# Patient Record
Sex: MALE | Race: WHITE | NOT HISPANIC OR LATINO | URBAN - METROPOLITAN AREA
[De-identification: names, ages, dates, MRNs, and addresses within clinical notes are randomized per-mention and may not be internally consistent; named-entity substitution may affect disease eponyms.]

---

## 2021-12-09 ENCOUNTER — EMERGENCY (EMERGENCY)
Facility: HOSPITAL | Age: 36
LOS: 1 days | Discharge: HOME | End: 2021-12-09
Attending: EMERGENCY MEDICINE
Payer: COMMERCIAL

## 2021-12-09 VITALS
HEIGHT: 68 IN | WEIGHT: 164.91 LBS | HEART RATE: 110 BPM | RESPIRATION RATE: 20 BRPM | OXYGEN SATURATION: 100 % | DIASTOLIC BLOOD PRESSURE: 70 MMHG | TEMPERATURE: 98 F | SYSTOLIC BLOOD PRESSURE: 139 MMHG

## 2021-12-09 LAB
ALBUMIN SERPL ELPH-MCNC: 4.9 G/DL — SIGNIFICANT CHANGE UP (ref 3.3–5.2)
ALP SERPL-CCNC: 59 U/L — SIGNIFICANT CHANGE UP (ref 40–120)
ALT FLD-CCNC: 34 U/L — SIGNIFICANT CHANGE UP
ANION GAP SERPL CALC-SCNC: 15 MMOL/L — SIGNIFICANT CHANGE UP (ref 5–17)
AST SERPL-CCNC: 42 U/L — HIGH
BASOPHILS # BLD AUTO: 0.05 K/UL — SIGNIFICANT CHANGE UP (ref 0–0.2)
BASOPHILS NFR BLD AUTO: 0.7 % — SIGNIFICANT CHANGE UP (ref 0–2)
BILIRUB SERPL-MCNC: 0.9 MG/DL — SIGNIFICANT CHANGE UP (ref 0.4–2)
BLD GP AB SCN SERPL QL: SIGNIFICANT CHANGE UP
BUN SERPL-MCNC: 12.6 MG/DL — SIGNIFICANT CHANGE UP (ref 8–20)
CALCIUM SERPL-MCNC: 10 MG/DL — SIGNIFICANT CHANGE UP (ref 8.6–10.2)
CHLORIDE SERPL-SCNC: 93 MMOL/L — LOW (ref 98–107)
CO2 SERPL-SCNC: 25 MMOL/L — SIGNIFICANT CHANGE UP (ref 22–29)
CREAT SERPL-MCNC: 0.67 MG/DL — SIGNIFICANT CHANGE UP (ref 0.5–1.3)
EOSINOPHIL # BLD AUTO: 0.33 K/UL — SIGNIFICANT CHANGE UP (ref 0–0.5)
EOSINOPHIL NFR BLD AUTO: 4.7 % — SIGNIFICANT CHANGE UP (ref 0–6)
GLUCOSE SERPL-MCNC: 116 MG/DL — HIGH (ref 70–99)
HCT VFR BLD CALC: 42.1 % — SIGNIFICANT CHANGE UP (ref 39–50)
HGB BLD-MCNC: 14.8 G/DL — SIGNIFICANT CHANGE UP (ref 13–17)
IMM GRANULOCYTES NFR BLD AUTO: 0.3 % — SIGNIFICANT CHANGE UP (ref 0–1.5)
LYMPHOCYTES # BLD AUTO: 1.58 K/UL — SIGNIFICANT CHANGE UP (ref 1–3.3)
LYMPHOCYTES # BLD AUTO: 22.3 % — SIGNIFICANT CHANGE UP (ref 13–44)
MCHC RBC-ENTMCNC: 33.9 PG — SIGNIFICANT CHANGE UP (ref 27–34)
MCHC RBC-ENTMCNC: 35.2 GM/DL — SIGNIFICANT CHANGE UP (ref 32–36)
MCV RBC AUTO: 96.3 FL — SIGNIFICANT CHANGE UP (ref 80–100)
MONOCYTES # BLD AUTO: 0.79 K/UL — SIGNIFICANT CHANGE UP (ref 0–0.9)
MONOCYTES NFR BLD AUTO: 11.2 % — SIGNIFICANT CHANGE UP (ref 2–14)
NEUTROPHILS # BLD AUTO: 4.31 K/UL — SIGNIFICANT CHANGE UP (ref 1.8–7.4)
NEUTROPHILS NFR BLD AUTO: 60.8 % — SIGNIFICANT CHANGE UP (ref 43–77)
PLATELET # BLD AUTO: 285 K/UL — SIGNIFICANT CHANGE UP (ref 150–400)
POTASSIUM SERPL-MCNC: 4.2 MMOL/L — SIGNIFICANT CHANGE UP (ref 3.5–5.3)
POTASSIUM SERPL-SCNC: 4.2 MMOL/L — SIGNIFICANT CHANGE UP (ref 3.5–5.3)
PROT SERPL-MCNC: 7.7 G/DL — SIGNIFICANT CHANGE UP (ref 6.6–8.7)
RBC # BLD: 4.37 M/UL — SIGNIFICANT CHANGE UP (ref 4.2–5.8)
RBC # FLD: 11 % — SIGNIFICANT CHANGE UP (ref 10.3–14.5)
SODIUM SERPL-SCNC: 133 MMOL/L — LOW (ref 135–145)
WBC # BLD: 7.08 K/UL — SIGNIFICANT CHANGE UP (ref 3.8–10.5)
WBC # FLD AUTO: 7.08 K/UL — SIGNIFICANT CHANGE UP (ref 3.8–10.5)

## 2021-12-09 PROCEDURE — 12002 RPR S/N/AX/GEN/TRNK2.6-7.5CM: CPT

## 2021-12-09 PROCEDURE — 73562 X-RAY EXAM OF KNEE 3: CPT | Mod: 26,RT

## 2021-12-09 PROCEDURE — 99284 EMERGENCY DEPT VISIT MOD MDM: CPT | Mod: 25

## 2021-12-09 RX ORDER — AMPICILLIN SODIUM AND SULBACTAM SODIUM 250; 125 MG/ML; MG/ML
3 INJECTION, POWDER, FOR SUSPENSION INTRAMUSCULAR; INTRAVENOUS ONCE
Refills: 0 | Status: COMPLETED | OUTPATIENT
Start: 2021-12-09 | End: 2021-12-09

## 2021-12-09 RX ORDER — SODIUM CHLORIDE 9 MG/ML
1000 INJECTION INTRAMUSCULAR; INTRAVENOUS; SUBCUTANEOUS ONCE
Refills: 0 | Status: COMPLETED | OUTPATIENT
Start: 2021-12-09 | End: 2021-12-09

## 2021-12-09 RX ADMIN — SODIUM CHLORIDE 1000 MILLILITER(S): 9 INJECTION INTRAMUSCULAR; INTRAVENOUS; SUBCUTANEOUS at 13:16

## 2021-12-09 RX ADMIN — AMPICILLIN SODIUM AND SULBACTAM SODIUM 200 GRAM(S): 250; 125 INJECTION, POWDER, FOR SUSPENSION INTRAMUSCULAR; INTRAVENOUS at 13:17

## 2021-12-09 NOTE — ED PROVIDER NOTE - ATTENDING CONTRIBUTION TO CARE
startled cat and sustained bite to inner aspect of right LE/ proximal tibia area.  Cat's Imm not UTD (did not received full rabies series).  PE:  2cm gaping laceration inner aspect of right leg at level of medial condyle of tibia.  A/P animal bite resulting in laceration: close approximation of wound with suture and prophylactic antibiotics.  LINDA contacted for rabies vaccination recommendations. startled cat and sustained bite to inner aspect of right LE/ proximal tibia area.  Cat's Imm not UTD (did not received full rabies series).  PE:  2cm gaping laceration inner aspect of right leg at level of medial condyle of tibia.  Xray: no radiopaque FB noted.  A/P animal bite resulting in laceration: close approximation of wound with suture and prophylactic antibiotics.  LINDA contacted for rabies vaccination recommendations.

## 2021-12-09 NOTE — ED PROVIDER NOTE - NSFOLLOWUPINSTRUCTIONS_ED_ALL_ED_FT
1. Take Augmentin 875mg twice a day for 10 days   2. Follow-up for suture removal in 7-10 days  3. If redness, fever or worsening pain Return to ED  4. Over the counter pain mediation as needed

## 2021-12-09 NOTE — ED ADULT NURSE NOTE - NSIMPLEMENTINTERV_GEN_ALL_ED
Implemented All Fall Risk Interventions:  [place] to call system. Call bell, personal items and telephone within reach. Instruct patient to call for assistance. Room bathroom lighting operational. Non-slip footwear when patient is off stretcher. Physically safe environment: no spills, clutter or unnecessary equipment. Stretcher in lowest position, wheels locked, appropriate side rails in place. Provide visual cue, wrist band, yellow gown, etc. Monitor gait and stability. Monitor for mental status changes and reorient to person, place, and time. Review medications for side effects contributing to fall risk. Reinforce activity limits and safety measures with patient and family.

## 2021-12-09 NOTE — ED PROVIDER NOTE - CARE PLAN
Principal Discharge DX:	Superficial laceration   1 Principal Discharge DX:	Animal bite  Secondary Diagnosis:	Superficial laceration of skin

## 2021-12-09 NOTE — ED PROVIDER NOTE - OBJECTIVE STATEMENT
HPI: 37 yo M presents complaining of R knee laceration. Pt states his cat attacked him. He cannot recall if cat bit or scratched him. Pt's mother states pt "passed out due to blood loss." Denies fever, chills, SOB, CP, head trauma, numbness, weakness, and paresthesia. HPI: 37 yo M presents complaining of R knee laceration. Pt states his cat attacked him. He cannot recall if cat bit or scratched him. Pt's mother states pt "passed out due to blood loss." Pt  denies fever, chills, SOB, CP, head trauma, numbness, weakness, and paresthesia. Pt explained that he went to an Urgent [facility] Center where he was sent to the ED for evaluation and treatment. Pt states that his cat has only one dose of the Rabies Vaccine. Pt explained that his cat ran away from home 2 weeks ago and returned last night. Pt says that while trying to place it in it's cage the cat got startled and bit or scratched him. 35 yo M presents complaining of R knee laceration. Pt states his cat attacked him. He cannot recall if cat bit or scratched him. Pt's mother states pt "passed out due to blood loss." Pt  denies fever, chills, SOB, CP, head trauma, numbness, weakness, and paresthesia. Pt explained that he went to an Urgent Care Center where he was sent to the ED for evaluation and treatment. Pt states that his cat has only one dose of the Rabies Vaccine. Pt explained that his cat ran away from home 2 weeks ago and returned last night. Pt says that while trying to place it in it's cage the cat got startled and bit or scratched him.

## 2021-12-09 NOTE — ED PROVIDER NOTE - PATIENT PORTAL LINK FT
You can access the FollowMyHealth Patient Portal offered by [facility] by registering at the following website: [email or web address]. By joining [facility]’s FollowMyHealth portal, you will also be able to view your health information using other applications (apps) compatible with our system.

## 2021-12-09 NOTE — ED ADULT TRIAGE NOTE - CHIEF COMPLAINT QUOTE
Patient arrived to ED today with c/o wound to his right lower leg.  Patient reports that his cat was missing for about 2 weeks and then came back and the cat "slashed" his leg last night.
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
## 2021-12-09 NOTE — ED PROVIDER NOTE - PROGRESS NOTE DETAILS
Wound cleaned and irrigated with 2 liters of Normal saline. laceration closed loosely and pt treated with Unasyn 3mg IV and Normal Saline 1000ml. Pt D/C in stable condition with RX sent ot his Pharmacy. F/U for suture removal in 7-10 days.

## 2023-02-24 PROBLEM — Z00.00 ENCOUNTER FOR PREVENTIVE HEALTH EXAMINATION: Status: ACTIVE | Noted: 2023-02-24

## 2023-03-07 ENCOUNTER — APPOINTMENT (OUTPATIENT)
Dept: NEUROSURGERY | Facility: CLINIC | Age: 38
End: 2023-03-07
Payer: MEDICAID

## 2023-03-07 VITALS
HEIGHT: 70 IN | DIASTOLIC BLOOD PRESSURE: 82 MMHG | HEART RATE: 109 BPM | BODY MASS INDEX: 25.05 KG/M2 | SYSTOLIC BLOOD PRESSURE: 125 MMHG | OXYGEN SATURATION: 98 % | WEIGHT: 175 LBS | TEMPERATURE: 97.3 F

## 2023-03-07 DIAGNOSIS — Z78.9 OTHER SPECIFIED HEALTH STATUS: ICD-10-CM

## 2023-03-07 DIAGNOSIS — Z86.59 PERSONAL HISTORY OF OTHER MENTAL AND BEHAVIORAL DISORDERS: ICD-10-CM

## 2023-03-07 PROCEDURE — 99204 OFFICE O/P NEW MOD 45 MIN: CPT

## 2023-03-07 NOTE — REVIEW OF SYSTEMS
[Sleep Disturbances] : sleep disturbances [Anxiety] : anxiety [Depression] : depression [As Noted in HPI] : as noted in HPI [Dizziness] : dizziness [Fainting] : fainting [Lightheadedness] : lightheadedness [Tension Headache] : tension-type headaches [Negative] : Heme/Lymph
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
## 2023-03-14 NOTE — PHYSICAL EXAM
[General Appearance - Alert] : alert [General Appearance - In No Acute Distress] : in no acute distress [Oriented To Time, Place, And Person] : oriented to person, place, and time [Impaired Insight] : insight and judgment were intact [Affect] : the affect was normal [Person] : oriented to person [Place] : oriented to place [Time] : oriented to time [Short Term Intact] : short term memory intact [Remote Intact] : remote memory intact [Span Intact] : the attention span was normal [Concentration Intact] : normal concentrating ability [Fluency] : fluency intact [Comprehension] : comprehension intact [Current Events] : adequate knowledge of current events [Past History] : adequate knowledge of personal past history [Vocabulary] : adequate range of vocabulary [Cranial Nerves Oculomotor (III)] : extraocular motion intact [Cranial Nerves Trigeminal (V)] : facial sensation intact symmetrically [Cranial Nerves Facial (VII)] : face symmetrical [Cranial Nerves Vestibulocochlear (VIII)] : hearing was intact bilaterally [Cranial Nerves Glossopharyngeal (IX)] : tongue and palate midline [Cranial Nerves Accessory (XI - Cranial And Spinal)] : head turning and shoulder shrug symmetric [Cranial Nerves Hypoglossal (XII)] : there was no tongue deviation with protrusion [Motor Tone] : muscle tone was normal in all four extremities [Motor Strength] : muscle strength was normal in all four extremities [No Muscle Atrophy] : normal bulk in all four extremities [Motor Handedness Right-Handed] : the patient is right hand dominant [Sensation Tactile Decrease] : light touch was intact [Balance] : balance was intact [Extraocular Movements] : extraocular movements were intact [Outer Ear] : the ears and nose were normal in appearance [] : no respiratory distress [Respiration, Rhythm And Depth] : normal respiratory rhythm and effort [Exaggerated Use Of Accessory Muscles For Inspiration] : no accessory muscle use [Heart Rate And Rhythm] : heart rate was normal and rhythm regular [Abnormal Walk] : normal gait [Involuntary Movements] : no involuntary movements were seen [Skin Color & Pigmentation] : normal skin color and pigmentation [Skin Turgor] : normal skin turgor [Limited Balance] : balance was intact [Past-pointing] : there was no past-pointing [Tremor] : no tremor present [Coordination - Dysmetria Impaired Finger-to-Nose Bilateral] : not present [FreeTextEntry6] : No pronator drift

## 2023-03-22 ENCOUNTER — APPOINTMENT (OUTPATIENT)
Dept: MRI IMAGING | Facility: CLINIC | Age: 38
End: 2023-03-22

## 2023-03-28 ENCOUNTER — APPOINTMENT (OUTPATIENT)
Dept: MRI IMAGING | Facility: CLINIC | Age: 38
End: 2023-03-28

## 2023-04-04 ENCOUNTER — OUTPATIENT (OUTPATIENT)
Dept: OUTPATIENT SERVICES | Facility: HOSPITAL | Age: 38
LOS: 1 days | End: 2023-04-04

## 2023-04-04 ENCOUNTER — APPOINTMENT (OUTPATIENT)
Dept: MRI IMAGING | Facility: CLINIC | Age: 38
End: 2023-04-04
Payer: MEDICAID

## 2023-04-04 DIAGNOSIS — E23.7 DISORDER OF PITUITARY GLAND, UNSPECIFIED: ICD-10-CM

## 2023-04-04 DIAGNOSIS — Q04.6 CONGENITAL CEREBRAL CYSTS: ICD-10-CM

## 2023-04-04 PROCEDURE — 70553 MRI BRAIN STEM W/O & W/DYE: CPT | Mod: 26

## 2023-04-12 ENCOUNTER — APPOINTMENT (OUTPATIENT)
Dept: NEUROSURGERY | Facility: CLINIC | Age: 38
End: 2023-04-12
Payer: MEDICAID

## 2023-04-12 VITALS
DIASTOLIC BLOOD PRESSURE: 89 MMHG | HEART RATE: 82 BPM | BODY MASS INDEX: 25.05 KG/M2 | OXYGEN SATURATION: 100 % | SYSTOLIC BLOOD PRESSURE: 133 MMHG | HEIGHT: 70 IN | WEIGHT: 175 LBS

## 2023-04-12 PROCEDURE — 99214 OFFICE O/P EST MOD 30 MIN: CPT

## 2023-04-19 ENCOUNTER — OFFICE (OUTPATIENT)
Dept: URBAN - METROPOLITAN AREA CLINIC 115 | Facility: CLINIC | Age: 38
Setting detail: OPHTHALMOLOGY
End: 2023-04-19
Payer: MEDICAID

## 2023-04-19 DIAGNOSIS — H35.033: ICD-10-CM

## 2023-04-19 DIAGNOSIS — D44.3: ICD-10-CM

## 2023-04-19 DIAGNOSIS — H53.433: ICD-10-CM

## 2023-04-19 PROCEDURE — 92083 EXTENDED VISUAL FIELD XM: CPT | Performed by: OPHTHALMOLOGY

## 2023-04-19 PROCEDURE — 92133 CPTRZD OPH DX IMG PST SGM ON: CPT | Performed by: OPHTHALMOLOGY

## 2023-04-19 PROCEDURE — 92004 COMPRE OPH EXAM NEW PT 1/>: CPT | Performed by: OPHTHALMOLOGY

## 2023-04-19 ASSESSMENT — VISUAL ACUITY
OS_BCVA: 20/20-
OD_BCVA: 20/20

## 2023-04-19 ASSESSMENT — REFRACTION_AUTOREFRACTION
OS_AXIS: 010
OD_SPHERE: -0.75
OD_AXIS: 135
OS_CYLINDER: -0.50
OS_SPHERE: +0.50
OD_CYLINDER: -0.75

## 2023-04-19 ASSESSMENT — SPHEQUIV_DERIVED
OD_SPHEQUIV: -1.125
OS_SPHEQUIV: 0.25

## 2023-04-19 ASSESSMENT — TONOMETRY
OD_IOP_MMHG: 12
OS_IOP_MMHG: 11

## 2023-04-19 ASSESSMENT — AXIALLENGTH_DERIVED
OD_AL: 23.5253
OS_AL: 23.1804

## 2023-04-19 ASSESSMENT — KERATOMETRY
OS_K1POWER_DIOPTERS: 44.25
OD_K1POWER_DIOPTERS: 44.50
OD_AXISANGLE_DEGREES: 025
OS_K2POWER_DIOPTERS: 44.50
OD_K2POWER_DIOPTERS: 45.25
OS_AXISANGLE_DEGREES: 132

## 2023-04-19 ASSESSMENT — CONFRONTATIONAL VISUAL FIELD TEST (CVF)
OD_FINDINGS: FULL
OS_FINDINGS: FULL

## 2023-04-27 ENCOUNTER — APPOINTMENT (OUTPATIENT)
Dept: OTOLARYNGOLOGY | Facility: CLINIC | Age: 38
End: 2023-04-27
Payer: MEDICAID

## 2023-04-27 VITALS
SYSTOLIC BLOOD PRESSURE: 148 MMHG | WEIGHT: 175 LBS | DIASTOLIC BLOOD PRESSURE: 98 MMHG | HEART RATE: 91 BPM | HEIGHT: 70 IN | BODY MASS INDEX: 25.05 KG/M2

## 2023-04-27 PROCEDURE — 99214 OFFICE O/P EST MOD 30 MIN: CPT | Mod: 25

## 2023-04-27 PROCEDURE — 31231 NASAL ENDOSCOPY DX: CPT

## 2023-04-27 NOTE — PHYSICAL EXAM
[Nasal Endoscopy Performed] : nasal endoscopy was performed, see procedure section for findings [Normal] : teeth are normal [de-identified] : visual fields grossly itnact, visual acuity grossly intact

## 2023-04-27 NOTE — CONSULT LETTER
[Consult Letter:] : I had the pleasure of evaluating your patient, [unfilled]. [Please see my note below.] : Please see my note below. [Consult Closing:] : Thank you very much for allowing me to participate in the care of this patient.  If you have any questions, please do not hesitate to contact me. [Sincerely,] : Sincerely, [FreeTextEntry3] : Tommy Aguilar MD, [facility]\par Otolaryngology \par Sinus and Endoscopic Skull Base Surgery \par Head and Neck Surgery\par \par [address], [address] [facility] [address]\[place] [address]\par \par [address] [address]\par \par Tel: [phone]\par Fax:[phone]

## 2023-04-27 NOTE — HISTORY OF PRESENT ILLNESS
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 States vision is slightly blurry, daily headaches, memory is not as sharp as it was,gets very light headed has fallen on the floor with no recollection of the occurrence, sinus pressure and pain, occasional post nasal drip. Denies fevers, nasal congestion, anterior rhinorrhea, post nasal drip, or poor sense of smell. 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 The pituitary stalk remains midline. No compression upon the optic chiasm. There is normal pituitary gland lateral to the Rathke cleft cyst.\par \par There is subtle nonspecific patchy FLAIR hyperintense signal in the periventricular white matter, may reflect artifact, however may reflect a demyelinating process versus other white matter etiologies in a patient of this age. Additionally, there is a focal 3 mm focus of FLAIR hyperintense signal in the right anterior centrum semiovale, with the same differential.\par \par \par \par

## 2023-04-27 NOTE — REASON FOR VISIT
[Initial Consultation] : an initial consultation for [Parent] : parent [FreeTextEntry2] :  rathke's cyst

## 2023-05-01 ENCOUNTER — RESULT REVIEW (OUTPATIENT)
Age: 38
End: 2023-05-01

## 2023-05-01 ENCOUNTER — OUTPATIENT (OUTPATIENT)
Dept: OUTPATIENT SERVICES | Facility: HOSPITAL | Age: 38
LOS: 1 days | End: 2023-05-01
Payer: MEDICAID

## 2023-05-01 VITALS
RESPIRATION RATE: 16 BRPM | WEIGHT: 170.42 LBS | OXYGEN SATURATION: 98 % | SYSTOLIC BLOOD PRESSURE: 120 MMHG | HEIGHT: 70 IN | DIASTOLIC BLOOD PRESSURE: 80 MMHG | TEMPERATURE: 97 F | HEART RATE: 76 BPM

## 2023-05-01 DIAGNOSIS — E23.6 OTHER DISORDERS OF PITUITARY GLAND: ICD-10-CM

## 2023-05-01 DIAGNOSIS — Z01.818 ENCOUNTER FOR OTHER PREPROCEDURAL EXAMINATION: ICD-10-CM

## 2023-05-01 DIAGNOSIS — Z91.89 OTHER SPECIFIED PERSONAL RISK FACTORS, NOT ELSEWHERE CLASSIFIED: ICD-10-CM

## 2023-05-01 LAB
A1C WITH ESTIMATED AVERAGE GLUCOSE RESULT: 4.6 % — SIGNIFICANT CHANGE UP (ref 4–5.6)
ALBUMIN SERPL ELPH-MCNC: 4.9 G/DL — SIGNIFICANT CHANGE UP (ref 3.3–5.2)
ALP SERPL-CCNC: 63 U/L — SIGNIFICANT CHANGE UP (ref 40–120)
ALT FLD-CCNC: 11 U/L — SIGNIFICANT CHANGE UP
ANION GAP SERPL CALC-SCNC: 13 MMOL/L — SIGNIFICANT CHANGE UP (ref 5–17)
APTT BLD: 31.9 SEC — SIGNIFICANT CHANGE UP (ref 27.5–35.5)
AST SERPL-CCNC: 18 U/L — SIGNIFICANT CHANGE UP
BASOPHILS # BLD AUTO: 0.08 K/UL — SIGNIFICANT CHANGE UP (ref 0–0.2)
BASOPHILS NFR BLD AUTO: 1.2 % — SIGNIFICANT CHANGE UP (ref 0–2)
BILIRUB SERPL-MCNC: 0.3 MG/DL — LOW (ref 0.4–2)
BLD GP AB SCN SERPL QL: SIGNIFICANT CHANGE UP
BUN SERPL-MCNC: 13.4 MG/DL — SIGNIFICANT CHANGE UP (ref 8–20)
CALCIUM SERPL-MCNC: 9.6 MG/DL — SIGNIFICANT CHANGE UP (ref 8.4–10.5)
CHLORIDE SERPL-SCNC: 97 MMOL/L — SIGNIFICANT CHANGE UP (ref 96–108)
CO2 SERPL-SCNC: 27 MMOL/L — SIGNIFICANT CHANGE UP (ref 22–29)
CREAT SERPL-MCNC: 1.08 MG/DL — SIGNIFICANT CHANGE UP (ref 0.5–1.3)
EGFR: 90 ML/MIN/1.73M2 — SIGNIFICANT CHANGE UP
EOSINOPHIL # BLD AUTO: 0.12 K/UL — SIGNIFICANT CHANGE UP (ref 0–0.5)
EOSINOPHIL NFR BLD AUTO: 1.8 % — SIGNIFICANT CHANGE UP (ref 0–6)
ESTIMATED AVERAGE GLUCOSE: 85 MG/DL — SIGNIFICANT CHANGE UP (ref 68–114)
GLUCOSE SERPL-MCNC: 90 MG/DL — SIGNIFICANT CHANGE UP (ref 70–99)
HCT VFR BLD CALC: 42 % — SIGNIFICANT CHANGE UP (ref 39–50)
HGB BLD-MCNC: 14.7 G/DL — SIGNIFICANT CHANGE UP (ref 13–17)
IMM GRANULOCYTES NFR BLD AUTO: 0.2 % — SIGNIFICANT CHANGE UP (ref 0–0.9)
INR BLD: 1.03 RATIO — SIGNIFICANT CHANGE UP (ref 0.88–1.16)
LYMPHOCYTES # BLD AUTO: 1.46 K/UL — SIGNIFICANT CHANGE UP (ref 1–3.3)
LYMPHOCYTES # BLD AUTO: 21.9 % — SIGNIFICANT CHANGE UP (ref 13–44)
MCHC RBC-ENTMCNC: 32.5 PG — SIGNIFICANT CHANGE UP (ref 27–34)
MCHC RBC-ENTMCNC: 35 GM/DL — SIGNIFICANT CHANGE UP (ref 32–36)
MCV RBC AUTO: 92.7 FL — SIGNIFICANT CHANGE UP (ref 80–100)
MONOCYTES # BLD AUTO: 0.62 K/UL — SIGNIFICANT CHANGE UP (ref 0–0.9)
MONOCYTES NFR BLD AUTO: 9.3 % — SIGNIFICANT CHANGE UP (ref 2–14)
MRSA PCR RESULT.: SIGNIFICANT CHANGE UP
NEUTROPHILS # BLD AUTO: 4.37 K/UL — SIGNIFICANT CHANGE UP (ref 1.8–7.4)
NEUTROPHILS NFR BLD AUTO: 65.6 % — SIGNIFICANT CHANGE UP (ref 43–77)
PLATELET # BLD AUTO: 337 K/UL — SIGNIFICANT CHANGE UP (ref 150–400)
POTASSIUM SERPL-MCNC: 4.6 MMOL/L — SIGNIFICANT CHANGE UP (ref 3.5–5.3)
POTASSIUM SERPL-SCNC: 4.6 MMOL/L — SIGNIFICANT CHANGE UP (ref 3.5–5.3)
PROT SERPL-MCNC: 7.7 G/DL — SIGNIFICANT CHANGE UP (ref 6.6–8.7)
PROTHROM AB SERPL-ACNC: 12 SEC — SIGNIFICANT CHANGE UP (ref 10.5–13.4)
RBC # BLD: 4.53 M/UL — SIGNIFICANT CHANGE UP (ref 4.2–5.8)
RBC # FLD: 12.4 % — SIGNIFICANT CHANGE UP (ref 10.3–14.5)
S AUREUS DNA NOSE QL NAA+PROBE: SIGNIFICANT CHANGE UP
SODIUM SERPL-SCNC: 137 MMOL/L — SIGNIFICANT CHANGE UP (ref 135–145)
WBC # BLD: 6.66 K/UL — SIGNIFICANT CHANGE UP (ref 3.8–10.5)
WBC # FLD AUTO: 6.66 K/UL — SIGNIFICANT CHANGE UP (ref 3.8–10.5)

## 2023-05-01 PROCEDURE — 71046 X-RAY EXAM CHEST 2 VIEWS: CPT | Mod: 26

## 2023-05-01 PROCEDURE — 71046 X-RAY EXAM CHEST 2 VIEWS: CPT

## 2023-05-01 PROCEDURE — 93005 ELECTROCARDIOGRAM TRACING: CPT

## 2023-05-01 PROCEDURE — G0463: CPT

## 2023-05-01 PROCEDURE — 93010 ELECTROCARDIOGRAM REPORT: CPT

## 2023-05-01 RX ORDER — ACETAMINOPHEN 500 MG
975 TABLET ORAL ONCE
Refills: 0 | Status: COMPLETED | OUTPATIENT
Start: 2023-05-08 | End: 2023-05-08

## 2023-05-01 RX ORDER — CEFAZOLIN SODIUM 1 G
2000 VIAL (EA) INJECTION ONCE
Refills: 0 | Status: DISCONTINUED | OUTPATIENT
Start: 2023-05-08 | End: 2023-05-08

## 2023-05-01 RX ORDER — SODIUM CHLORIDE 9 MG/ML
3 INJECTION INTRAMUSCULAR; INTRAVENOUS; SUBCUTANEOUS EVERY 8 HOURS
Refills: 0 | Status: DISCONTINUED | OUTPATIENT
Start: 2023-05-08 | End: 2023-05-08

## 2023-05-01 NOTE — H&P PST ADULT - PROBLEM SELECTOR PLAN 1
medical clearance pending  Patient is scheduled for endoscopic endonasal transphenoidal rathke cyst resection on 5/8/23 with Dr. Chinchilla.
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
## 2023-05-01 NOTE — H&P PST ADULT - NSANTHOSAYNRD_GEN_A_CORE
No. HAVEN screening performed.  STOP BANG Legend: 0-2 = LOW Risk; 3-4 = INTERMEDIATE Risk; 5-8 = HIGH Risk

## 2023-05-01 NOTE — H&P PST ADULT - CARDIOVASCULAR
negative normal/regular rate and rhythm/S1 S2 present/no gallops/no rub/no murmur/no JVD/normal PMI/no pedal edema/vascular
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
## 2023-05-01 NOTE — H&P PST ADULT - ATTENDING COMMENTS
I examined the pt at the day of the surgery in the morning. The procedure and the risks of the procedure including infection, csf leak, vision changes, stroke, DI, pituitary hormones replacement need were explained in details to the pt and his mother. The pt signed the consent.

## 2023-05-01 NOTE — H&P PST ADULT - NEGATIVE NEUROLOGICAL SYMPTOMS
History/Exam/Medical decision making no weakness/no paresthesias/no generalized seizures/no focal seizures/no difficulty walking/no hemiparesis/no confusion/no facial palsy

## 2023-05-05 ENCOUNTER — RESULT REVIEW (OUTPATIENT)
Age: 38
End: 2023-05-05

## 2023-05-05 ENCOUNTER — OUTPATIENT (OUTPATIENT)
Dept: OUTPATIENT SERVICES | Facility: HOSPITAL | Age: 38
LOS: 1 days | End: 2023-05-05

## 2023-05-05 ENCOUNTER — NON-APPOINTMENT (OUTPATIENT)
Age: 38
End: 2023-05-05

## 2023-05-05 ENCOUNTER — OUTPATIENT (OUTPATIENT)
Dept: OUTPATIENT SERVICES | Facility: HOSPITAL | Age: 38
LOS: 1 days | End: 2023-05-05
Payer: MEDICAID

## 2023-05-05 ENCOUNTER — APPOINTMENT (OUTPATIENT)
Dept: CARDIOLOGY | Facility: CLINIC | Age: 38
End: 2023-05-05
Payer: MEDICAID

## 2023-05-05 VITALS — SYSTOLIC BLOOD PRESSURE: 113 MMHG | DIASTOLIC BLOOD PRESSURE: 72 MMHG

## 2023-05-05 VITALS
DIASTOLIC BLOOD PRESSURE: 78 MMHG | WEIGHT: 169 LBS | HEIGHT: 70 IN | SYSTOLIC BLOOD PRESSURE: 127 MMHG | TEMPERATURE: 97.9 F | OXYGEN SATURATION: 100 % | HEART RATE: 67 BPM | BODY MASS INDEX: 24.2 KG/M2

## 2023-05-05 VITALS — SYSTOLIC BLOOD PRESSURE: 110 MMHG | DIASTOLIC BLOOD PRESSURE: 80 MMHG

## 2023-05-05 DIAGNOSIS — R55 SYNCOPE AND COLLAPSE: ICD-10-CM

## 2023-05-05 DIAGNOSIS — Z78.9 OTHER SPECIFIED HEALTH STATUS: ICD-10-CM

## 2023-05-05 DIAGNOSIS — Z87.828 PERSONAL HISTORY OF OTHER (HEALED) PHYSICAL INJURY AND TRAUMA: ICD-10-CM

## 2023-05-05 DIAGNOSIS — E23.7 DISORDER OF PITUITARY GLAND, UNSPECIFIED: ICD-10-CM

## 2023-05-05 DIAGNOSIS — R42 DIZZINESS AND GIDDINESS: ICD-10-CM

## 2023-05-05 DIAGNOSIS — Z91.81 HISTORY OF FALLING: ICD-10-CM

## 2023-05-05 PROCEDURE — 93000 ELECTROCARDIOGRAM COMPLETE: CPT

## 2023-05-05 PROCEDURE — 70486 CT MAXILLOFACIAL W/O DYE: CPT | Mod: 26

## 2023-05-05 PROCEDURE — 99204 OFFICE O/P NEW MOD 45 MIN: CPT | Mod: 25

## 2023-05-05 PROCEDURE — 70486 CT MAXILLOFACIAL W/O DYE: CPT
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
## 2023-05-05 NOTE — ASSESSMENT
[FreeTextEntry1] : EKG 5/5/2023- Sinus  Rhythm \par WITHIN NORMAL LIMITS\par \par Echocardiogram May 5, 2023: LVEF 55 to 60%.  No significant valvular heart disease.  Normal echo.\par \par Assessment:\par 1.  Near syncope\par 2.  Preop cardiac risk assessment prior to neurosurgery.\par \par Recommendations:\par Patient has a normal ECG.  Patient's physical examination is unremarkable.\par Patient has excellent exercise capacity greater than 4 METS.\par Patient is not orthostatic.\par \par 1.  Echocardiogram\par 2.  30-day event monitor.\par 3.  Follow-up in 2 months\par \par Patient is low risk for perioperative cardiac events from the neurosurgery procedure.\par NO ABSOLUTE CONTRAINDICATIONS TO PROCEED WITH THE NEUROSURGERY.\par

## 2023-05-07 ENCOUNTER — TRANSCRIPTION ENCOUNTER (OUTPATIENT)
Age: 38
End: 2023-05-07

## 2023-05-08 ENCOUNTER — APPOINTMENT (OUTPATIENT)
Dept: NEUROSURGERY | Facility: HOSPITAL | Age: 38
End: 2023-05-08

## 2023-05-08 ENCOUNTER — APPOINTMENT (OUTPATIENT)
Dept: OTOLARYNGOLOGY | Facility: HOSPITAL | Age: 38
End: 2023-05-08

## 2023-05-08 ENCOUNTER — TRANSCRIPTION ENCOUNTER (OUTPATIENT)
Age: 38
End: 2023-05-08

## 2023-05-08 ENCOUNTER — INPATIENT (INPATIENT)
Facility: HOSPITAL | Age: 38
LOS: 2 days | Discharge: HOME | DRG: 615 | End: 2023-05-11
Attending: NEUROLOGICAL SURGERY | Admitting: NEUROLOGICAL SURGERY
Payer: MEDICAID

## 2023-05-08 VITALS
SYSTOLIC BLOOD PRESSURE: 110 MMHG | RESPIRATION RATE: 16 BRPM | WEIGHT: 169.98 LBS | DIASTOLIC BLOOD PRESSURE: 76 MMHG | HEIGHT: 70 IN | OXYGEN SATURATION: 98 % | HEART RATE: 72 BPM | TEMPERATURE: 98 F

## 2023-05-08 DIAGNOSIS — E23.6 OTHER DISORDERS OF PITUITARY GLAND: ICD-10-CM

## 2023-05-08 LAB
ABO RH CONFIRMATION: SIGNIFICANT CHANGE UP
ANION GAP SERPL CALC-SCNC: 14 MMOL/L — SIGNIFICANT CHANGE UP (ref 5–17)
BUN SERPL-MCNC: 10.8 MG/DL — SIGNIFICANT CHANGE UP (ref 8–20)
CALCIUM SERPL-MCNC: 8.7 MG/DL — SIGNIFICANT CHANGE UP (ref 8.4–10.5)
CHLORIDE SERPL-SCNC: 99 MMOL/L — SIGNIFICANT CHANGE UP (ref 96–108)
CO2 SERPL-SCNC: 25 MMOL/L — SIGNIFICANT CHANGE UP (ref 22–29)
CREAT SERPL-MCNC: 0.88 MG/DL — SIGNIFICANT CHANGE UP (ref 0.5–1.3)
EGFR: 113 ML/MIN/1.73M2 — SIGNIFICANT CHANGE UP
GLUCOSE SERPL-MCNC: 168 MG/DL — HIGH (ref 70–99)
OSMOLALITY SERPL: 290 MOSMOL/KG — SIGNIFICANT CHANGE UP (ref 275–300)
OSMOLALITY UR: 827 MOSM/KG — SIGNIFICANT CHANGE UP (ref 300–1000)
POTASSIUM SERPL-MCNC: 4 MMOL/L — SIGNIFICANT CHANGE UP (ref 3.5–5.3)
POTASSIUM SERPL-SCNC: 4 MMOL/L — SIGNIFICANT CHANGE UP (ref 3.5–5.3)
SODIUM SERPL-SCNC: 137 MMOL/L — SIGNIFICANT CHANGE UP (ref 135–145)
SODIUM SERPL-SCNC: 138 MMOL/L — SIGNIFICANT CHANGE UP (ref 135–145)

## 2023-05-08 PROCEDURE — 70450 CT HEAD/BRAIN W/O DYE: CPT | Mod: 26

## 2023-05-08 PROCEDURE — 61782 SCAN PROC CRANIAL EXTRA: CPT

## 2023-05-08 PROCEDURE — 62165 REMOVE PITUIT TUMOR W/SCOPE: CPT | Mod: 62

## 2023-05-08 PROCEDURE — 99221 1ST HOSP IP/OBS SF/LOW 40: CPT

## 2023-05-08 PROCEDURE — 61781 SCAN PROC CRANIAL INTRA: CPT

## 2023-05-08 DEVICE — MAYFIELD SKULL PIN ADULT STEEL: Type: IMPLANTABLE DEVICE | Status: FUNCTIONAL

## 2023-05-08 DEVICE — KIT A-LINE 1LUM 20G X 12CM SAFE KIT: Type: IMPLANTABLE DEVICE | Status: FUNCTIONAL

## 2023-05-08 DEVICE — FLOSEAL WITH RECOTHROM THROMBIN 5ML: Type: IMPLANTABLE DEVICE | Status: FUNCTIONAL

## 2023-05-08 RX ORDER — NOREPINEPHRINE BITARTRATE/D5W 8 MG/250ML
0.05 PLASTIC BAG, INJECTION (ML) INTRAVENOUS
Qty: 8 | Refills: 0 | Status: DISCONTINUED | OUTPATIENT
Start: 2023-05-08 | End: 2023-05-08

## 2023-05-08 RX ORDER — OXYCODONE HYDROCHLORIDE 5 MG/1
5 TABLET ORAL EVERY 4 HOURS
Refills: 0 | Status: DISCONTINUED | OUTPATIENT
Start: 2023-05-08 | End: 2023-05-11

## 2023-05-08 RX ORDER — HYDROMORPHONE HYDROCHLORIDE 2 MG/ML
0.5 INJECTION INTRAMUSCULAR; INTRAVENOUS; SUBCUTANEOUS EVERY 6 HOURS
Refills: 0 | Status: DISCONTINUED | OUTPATIENT
Start: 2023-05-08 | End: 2023-05-08

## 2023-05-08 RX ORDER — ACETAMINOPHEN 500 MG
1000 TABLET ORAL ONCE
Refills: 0 | Status: COMPLETED | OUTPATIENT
Start: 2023-05-08 | End: 2023-05-08

## 2023-05-08 RX ORDER — OXYCODONE HYDROCHLORIDE 5 MG/1
10 TABLET ORAL EVERY 4 HOURS
Refills: 0 | Status: DISCONTINUED | OUTPATIENT
Start: 2023-05-08 | End: 2023-05-11

## 2023-05-08 RX ORDER — POLYETHYLENE GLYCOL 3350 17 G/17G
17 POWDER, FOR SOLUTION ORAL DAILY
Refills: 0 | Status: DISCONTINUED | OUTPATIENT
Start: 2023-05-08 | End: 2023-05-11

## 2023-05-08 RX ORDER — ACETAMINOPHEN 500 MG
650 TABLET ORAL EVERY 6 HOURS
Refills: 0 | Status: DISCONTINUED | OUTPATIENT
Start: 2023-05-08 | End: 2023-05-11

## 2023-05-08 RX ORDER — HYDRALAZINE HCL 50 MG
10 TABLET ORAL
Refills: 0 | Status: DISCONTINUED | OUTPATIENT
Start: 2023-05-08 | End: 2023-05-11

## 2023-05-08 RX ORDER — LABETALOL HCL 100 MG
10 TABLET ORAL
Refills: 0 | Status: DISCONTINUED | OUTPATIENT
Start: 2023-05-08 | End: 2023-05-09

## 2023-05-08 RX ORDER — CEFAZOLIN SODIUM 1 G
2000 VIAL (EA) INJECTION EVERY 8 HOURS
Refills: 0 | Status: DISCONTINUED | OUTPATIENT
Start: 2023-05-08 | End: 2023-05-08

## 2023-05-08 RX ORDER — APREPITANT 80 MG/1
40 CAPSULE ORAL ONCE
Refills: 0 | Status: COMPLETED | OUTPATIENT
Start: 2023-05-08 | End: 2023-05-08

## 2023-05-08 RX ORDER — HYDROMORPHONE HYDROCHLORIDE 2 MG/ML
0.5 INJECTION INTRAMUSCULAR; INTRAVENOUS; SUBCUTANEOUS EVERY 4 HOURS
Refills: 0 | Status: DISCONTINUED | OUTPATIENT
Start: 2023-05-08 | End: 2023-05-11

## 2023-05-08 RX ORDER — CEFAZOLIN SODIUM 1 G
2000 VIAL (EA) INJECTION EVERY 8 HOURS
Refills: 0 | Status: COMPLETED | OUTPATIENT
Start: 2023-05-08 | End: 2023-05-09

## 2023-05-08 RX ORDER — SODIUM CHLORIDE 0.65 %
1 AEROSOL, SPRAY (ML) NASAL
Refills: 0 | Status: DISCONTINUED | OUTPATIENT
Start: 2023-05-08 | End: 2023-05-11

## 2023-05-08 RX ORDER — SENNA PLUS 8.6 MG/1
2 TABLET ORAL AT BEDTIME
Refills: 0 | Status: DISCONTINUED | OUTPATIENT
Start: 2023-05-08 | End: 2023-05-11

## 2023-05-08 RX ADMIN — Medication 400 MILLIGRAM(S): at 13:14

## 2023-05-08 RX ADMIN — Medication 1 SPRAY(S): at 23:39

## 2023-05-08 RX ADMIN — Medication 1 SPRAY(S): at 17:13

## 2023-05-08 RX ADMIN — Medication 2000 MILLIGRAM(S): at 22:33

## 2023-05-08 RX ADMIN — Medication 1 SPRAY(S): at 16:07

## 2023-05-08 RX ADMIN — Medication 10 MILLIGRAM(S): at 12:58

## 2023-05-08 RX ADMIN — OXYCODONE HYDROCHLORIDE 10 MILLIGRAM(S): 5 TABLET ORAL at 16:00

## 2023-05-08 RX ADMIN — Medication 2000 MILLIGRAM(S): at 16:07

## 2023-05-08 RX ADMIN — OXYCODONE HYDROCHLORIDE 10 MILLIGRAM(S): 5 TABLET ORAL at 22:33

## 2023-05-08 RX ADMIN — APREPITANT 40 MILLIGRAM(S): 80 CAPSULE ORAL at 07:17

## 2023-05-08 RX ADMIN — Medication 10 MILLIGRAM(S): at 13:31

## 2023-05-08 RX ADMIN — Medication 1000 MILLIGRAM(S): at 16:00

## 2023-05-08 RX ADMIN — OXYCODONE HYDROCHLORIDE 10 MILLIGRAM(S): 5 TABLET ORAL at 23:22

## 2023-05-08 RX ADMIN — HYDROMORPHONE HYDROCHLORIDE 0.5 MILLIGRAM(S): 2 INJECTION INTRAMUSCULAR; INTRAVENOUS; SUBCUTANEOUS at 12:00

## 2023-05-08 RX ADMIN — HYDROMORPHONE HYDROCHLORIDE 0.5 MILLIGRAM(S): 2 INJECTION INTRAMUSCULAR; INTRAVENOUS; SUBCUTANEOUS at 11:27

## 2023-05-08 RX ADMIN — OXYCODONE HYDROCHLORIDE 10 MILLIGRAM(S): 5 TABLET ORAL at 14:49

## 2023-05-08 RX ADMIN — SENNA PLUS 2 TABLET(S): 8.6 TABLET ORAL at 22:33

## 2023-05-08 RX ADMIN — HYDROMORPHONE HYDROCHLORIDE 0.5 MILLIGRAM(S): 2 INJECTION INTRAMUSCULAR; INTRAVENOUS; SUBCUTANEOUS at 23:52

## 2023-05-08 RX ADMIN — HYDROMORPHONE HYDROCHLORIDE 0.5 MILLIGRAM(S): 2 INJECTION INTRAMUSCULAR; INTRAVENOUS; SUBCUTANEOUS at 23:38

## 2023-05-08 RX ADMIN — OXYCODONE HYDROCHLORIDE 10 MILLIGRAM(S): 5 TABLET ORAL at 18:28

## 2023-05-08 RX ADMIN — Medication 975 MILLIGRAM(S): at 06:10

## 2023-05-08 RX ADMIN — HYDROMORPHONE HYDROCHLORIDE 0.5 MILLIGRAM(S): 2 INJECTION INTRAMUSCULAR; INTRAVENOUS; SUBCUTANEOUS at 19:35

## 2023-05-08 RX ADMIN — HYDROMORPHONE HYDROCHLORIDE 0.5 MILLIGRAM(S): 2 INJECTION INTRAMUSCULAR; INTRAVENOUS; SUBCUTANEOUS at 19:50
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
## 2023-05-08 NOTE — PROGRESS NOTE ADULT - ASSESSMENT
Patient is a 38M now POD 0 s/p endoscopic endonasal transphenoidal Rathke cyst resection on 5/8/23 with Dr. Chinchilla/Dr. Aguilar.

## 2023-05-08 NOTE — CONSULT NOTE ADULT - ASSESSMENT
38M post op from Transsphenoidal Rathke's Cysts Resection     Plan   Neuro  - q1 neuro checks   - post op CT head   - Pain control   - CSF precautions: no straws, no nasal cannula, no nasal swabs, no ngt    Cards  - -140   - PRN Hydralazine / Labetalol     Resp  - Maintain SpO2 > 92%   - Humidified face tent prn     GI   - Regular diet   - Senna/Miralax       - Enriquez for strict ins and outs   - DI watch, treat for UOP > 300 cc x 2 hours or Na > 147    ID  - Post op ancef     Endo  - a1c 4.6, no active issues     Heme:  - SCDs only for dvt ppx     Dispo: NSICU      38M post op from Transsphenoidal Rathke's Cysts Resection     Plan   Neuro  - q1 neuro checks   - post op CT head   - Pain control   - CSF precautions: no straws, no nasal cannula, no nasal swabs, no ngt    Cards  - -140   - PRN Hydralazine / Labetalol     Resp  - Maintain SpO2 > 92%   - Humidified face tent prn     GI   - Regular diet   - Senna/Miralax       - Enriquez for strict ins and outs   - DI watch, treat for UOP > 300 cc x 2 hours or Na > 147    ID  - Post op ancef     Endo  - a1c 4.6, no active issues   - hormone panel in am  - cortisol in am   - q8 bmp, urine osm, serum osm for DI watch     Heme:  - SCDs only for dvt ppx     Dispo: NSICU      38M post op from Transsphenoidal Rathke's Cysts Resection     Plan   Neuro  - q1 neuro checks   - post op CT head   - Pain control   - CSF precautions: no straws, no nasal cannula, no nasal swabs, no ngt    Cards  - -160  - PRN Hydralazine / Labetalol     Resp  - Maintain SpO2 > 92%   - Humidified face tent prn     GI   - Regular diet   - Senna/Miralax       - Enriquez for strict ins and outs   - DI watch, treat for UOP > 300 cc x 2 hours or Na > 147    ID  - Post op ancef     Endo  - a1c 4.6, no active issues   - hormone panel in am  - cortisol in am   - q8 bmp, urine osm, serum osm for DI watch     Heme:  - SCDs only for dvt ppx     Dispo: NSICU
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
## 2023-05-08 NOTE — CONSULT NOTE ADULT - TIME BILLING
37yo M with Rathke's cyst, s/p TSP resection 5/8.    Plan:  neurochecks  pituitary precautions  stability Cth  diet as tolerated; maintain IV hydration until full PO  pituitary labs, monitor UOP and BMP (risk of DI)  maintain -140, maintain MAP>65  rest as above    Time spent included review of relevant history, clinical examination, review of data and images, discussion of treatment with the multidisciplinary team and any consultants involved in this patient’s care.

## 2023-05-08 NOTE — BRIEF OPERATIVE NOTE - NSICDXBRIEFPROCEDURE_GEN_ALL_CORE_FT
PROCEDURES:  Excision, neoplasm or cyst, pituitary, endoscopic, transsphenoidal approach 08-May-2023 11:42:56  Irasema Huerta

## 2023-05-09 LAB
ACTH SER-ACNC: 61.6 PG/ML — SIGNIFICANT CHANGE UP (ref 7.2–63.3)
ANION GAP SERPL CALC-SCNC: 13 MMOL/L — SIGNIFICANT CHANGE UP (ref 5–17)
ANION GAP SERPL CALC-SCNC: 13 MMOL/L — SIGNIFICANT CHANGE UP (ref 5–17)
ANION GAP SERPL CALC-SCNC: 15 MMOL/L — SIGNIFICANT CHANGE UP (ref 5–17)
APPEARANCE UR: CLEAR — SIGNIFICANT CHANGE UP
BILIRUB UR-MCNC: NEGATIVE — SIGNIFICANT CHANGE UP
BUN SERPL-MCNC: 10.1 MG/DL — SIGNIFICANT CHANGE UP (ref 8–20)
BUN SERPL-MCNC: 7 MG/DL — LOW (ref 8–20)
BUN SERPL-MCNC: 8.7 MG/DL — SIGNIFICANT CHANGE UP (ref 8–20)
CALCIUM SERPL-MCNC: 9 MG/DL — SIGNIFICANT CHANGE UP (ref 8.4–10.5)
CALCIUM SERPL-MCNC: 9 MG/DL — SIGNIFICANT CHANGE UP (ref 8.4–10.5)
CALCIUM SERPL-MCNC: 9.3 MG/DL — SIGNIFICANT CHANGE UP (ref 8.4–10.5)
CHLORIDE SERPL-SCNC: 100 MMOL/L — SIGNIFICANT CHANGE UP (ref 96–108)
CHLORIDE SERPL-SCNC: 101 MMOL/L — SIGNIFICANT CHANGE UP (ref 96–108)
CHLORIDE SERPL-SCNC: 102 MMOL/L — SIGNIFICANT CHANGE UP (ref 96–108)
CO2 SERPL-SCNC: 25 MMOL/L — SIGNIFICANT CHANGE UP (ref 22–29)
CO2 SERPL-SCNC: 26 MMOL/L — SIGNIFICANT CHANGE UP (ref 22–29)
CO2 SERPL-SCNC: 26 MMOL/L — SIGNIFICANT CHANGE UP (ref 22–29)
COLOR SPEC: YELLOW — SIGNIFICANT CHANGE UP
CORTIS AM PEAK SERPL-MCNC: 42.3 UG/DL — HIGH (ref 6–18.4)
CREAT SERPL-MCNC: 0.78 MG/DL — SIGNIFICANT CHANGE UP (ref 0.5–1.3)
CREAT SERPL-MCNC: 0.8 MG/DL — SIGNIFICANT CHANGE UP (ref 0.5–1.3)
CREAT SERPL-MCNC: 0.86 MG/DL — SIGNIFICANT CHANGE UP (ref 0.5–1.3)
DIFF PNL FLD: ABNORMAL
EGFR: 114 ML/MIN/1.73M2 — SIGNIFICANT CHANGE UP
EGFR: 116 ML/MIN/1.73M2 — SIGNIFICANT CHANGE UP
EGFR: 117 ML/MIN/1.73M2 — SIGNIFICANT CHANGE UP
EPI CELLS # UR: SIGNIFICANT CHANGE UP
FSH SERPL-MCNC: 1.5 IU/L — SIGNIFICANT CHANGE UP (ref 1.5–12.4)
GH SERPL-MCNC: 2.42 NG/ML — SIGNIFICANT CHANGE UP (ref 0.03–2.47)
GLUCOSE SERPL-MCNC: 145 MG/DL — HIGH (ref 70–99)
GLUCOSE SERPL-MCNC: 155 MG/DL — HIGH (ref 70–99)
GLUCOSE SERPL-MCNC: 184 MG/DL — HIGH (ref 70–99)
GLUCOSE UR QL: NEGATIVE MG/DL — SIGNIFICANT CHANGE UP
HCT VFR BLD CALC: 42.2 % — SIGNIFICANT CHANGE UP (ref 39–50)
HGB BLD-MCNC: 14.8 G/DL — SIGNIFICANT CHANGE UP (ref 13–17)
KETONES UR-MCNC: ABNORMAL
LEUKOCYTE ESTERASE UR-ACNC: NEGATIVE — SIGNIFICANT CHANGE UP
LH SERPL-ACNC: 10.3 IU/L — SIGNIFICANT CHANGE UP
MAGNESIUM SERPL-MCNC: 2.2 MG/DL — SIGNIFICANT CHANGE UP (ref 1.6–2.6)
MCHC RBC-ENTMCNC: 32.4 PG — SIGNIFICANT CHANGE UP (ref 27–34)
MCHC RBC-ENTMCNC: 35.1 GM/DL — SIGNIFICANT CHANGE UP (ref 32–36)
MCV RBC AUTO: 92.3 FL — SIGNIFICANT CHANGE UP (ref 80–100)
NITRITE UR-MCNC: NEGATIVE — SIGNIFICANT CHANGE UP
PH UR: 6 — SIGNIFICANT CHANGE UP (ref 5–8)
PHOSPHATE SERPL-MCNC: 3.2 MG/DL — SIGNIFICANT CHANGE UP (ref 2.4–4.7)
PLATELET # BLD AUTO: 369 K/UL — SIGNIFICANT CHANGE UP (ref 150–400)
POTASSIUM SERPL-MCNC: 4 MMOL/L — SIGNIFICANT CHANGE UP (ref 3.5–5.3)
POTASSIUM SERPL-MCNC: 4.1 MMOL/L — SIGNIFICANT CHANGE UP (ref 3.5–5.3)
POTASSIUM SERPL-MCNC: 4.3 MMOL/L — SIGNIFICANT CHANGE UP (ref 3.5–5.3)
POTASSIUM SERPL-SCNC: 4 MMOL/L — SIGNIFICANT CHANGE UP (ref 3.5–5.3)
POTASSIUM SERPL-SCNC: 4.1 MMOL/L — SIGNIFICANT CHANGE UP (ref 3.5–5.3)
POTASSIUM SERPL-SCNC: 4.3 MMOL/L — SIGNIFICANT CHANGE UP (ref 3.5–5.3)
PROLACTIN SERPL-MCNC: 7.1 NG/ML — SIGNIFICANT CHANGE UP (ref 4.1–18.4)
PROT UR-MCNC: NEGATIVE — SIGNIFICANT CHANGE UP
RBC # BLD: 4.57 M/UL — SIGNIFICANT CHANGE UP (ref 4.2–5.8)
RBC # FLD: 12.6 % — SIGNIFICANT CHANGE UP (ref 10.3–14.5)
RBC CASTS # UR COMP ASSIST: SIGNIFICANT CHANGE UP /HPF (ref 0–4)
SODIUM SERPL-SCNC: 139 MMOL/L — SIGNIFICANT CHANGE UP (ref 135–145)
SODIUM SERPL-SCNC: 140 MMOL/L — SIGNIFICANT CHANGE UP (ref 135–145)
SODIUM SERPL-SCNC: 142 MMOL/L — SIGNIFICANT CHANGE UP (ref 135–145)
SP GR SPEC: 1.01 — SIGNIFICANT CHANGE UP (ref 1.01–1.02)
T3 SERPL-MCNC: 51 NG/DL — LOW (ref 80–200)
T4 AB SER-ACNC: 6.5 UG/DL — SIGNIFICANT CHANGE UP (ref 4.5–12)
TESTOST FREE+TOTAL PANEL SERPL-MCNC: 470 NG/DL — SIGNIFICANT CHANGE UP (ref 300–836)
TSH SERPL-MCNC: 1.34 UIU/ML — SIGNIFICANT CHANGE UP (ref 0.27–4.2)
UROBILINOGEN FLD QL: NEGATIVE MG/DL — SIGNIFICANT CHANGE UP
WBC # BLD: 14.49 K/UL — HIGH (ref 3.8–10.5)
WBC # FLD AUTO: 14.49 K/UL — HIGH (ref 3.8–10.5)
WBC UR QL: NEGATIVE /HPF — SIGNIFICANT CHANGE UP (ref 0–5)

## 2023-05-09 PROCEDURE — 99233 SBSQ HOSP IP/OBS HIGH 50: CPT

## 2023-05-09 RX ORDER — ENOXAPARIN SODIUM 100 MG/ML
40 INJECTION SUBCUTANEOUS EVERY 24 HOURS
Refills: 0 | Status: DISCONTINUED | OUTPATIENT
Start: 2023-05-09 | End: 2023-05-11

## 2023-05-09 RX ORDER — DEXTROAMPHETAMINE SACCHARATE, AMPHETAMINE ASPARTATE, DEXTROAMPHETAMINE SULFATE AND AMPHETAMINE SULFATE 1.875; 1.875; 1.875; 1.875 MG/1; MG/1; MG/1; MG/1
30 TABLET ORAL
Refills: 0 | Status: DISCONTINUED | OUTPATIENT
Start: 2023-05-09 | End: 2023-05-11

## 2023-05-09 RX ORDER — CLONAZEPAM 1 MG
0.5 TABLET ORAL DAILY
Refills: 0 | Status: DISCONTINUED | OUTPATIENT
Start: 2023-05-09 | End: 2023-05-11

## 2023-05-09 RX ORDER — ARIPIPRAZOLE 15 MG/1
2 TABLET ORAL DAILY
Refills: 0 | Status: DISCONTINUED | OUTPATIENT
Start: 2023-05-09 | End: 2023-05-10

## 2023-05-09 RX ORDER — SODIUM CHLORIDE 9 MG/ML
1000 INJECTION INTRAMUSCULAR; INTRAVENOUS; SUBCUTANEOUS ONCE
Refills: 0 | Status: COMPLETED | OUTPATIENT
Start: 2023-05-09 | End: 2023-05-09

## 2023-05-09 RX ORDER — CHLORHEXIDINE GLUCONATE 213 G/1000ML
1 SOLUTION TOPICAL
Refills: 0 | Status: DISCONTINUED | OUTPATIENT
Start: 2023-05-09 | End: 2023-05-09

## 2023-05-09 RX ORDER — FLUOXETINE HCL 10 MG
40 CAPSULE ORAL DAILY
Refills: 0 | Status: DISCONTINUED | OUTPATIENT
Start: 2023-05-09 | End: 2023-05-10

## 2023-05-09 RX ORDER — PRAZOSIN HCL 2 MG
1 CAPSULE ORAL AT BEDTIME
Refills: 0 | Status: DISCONTINUED | OUTPATIENT
Start: 2023-05-09 | End: 2023-05-11

## 2023-05-09 RX ADMIN — SODIUM CHLORIDE 250 MILLILITER(S): 9 INJECTION INTRAMUSCULAR; INTRAVENOUS; SUBCUTANEOUS at 06:08

## 2023-05-09 RX ADMIN — HYDROMORPHONE HYDROCHLORIDE 0.5 MILLIGRAM(S): 2 INJECTION INTRAMUSCULAR; INTRAVENOUS; SUBCUTANEOUS at 13:22

## 2023-05-09 RX ADMIN — HYDROMORPHONE HYDROCHLORIDE 0.5 MILLIGRAM(S): 2 INJECTION INTRAMUSCULAR; INTRAVENOUS; SUBCUTANEOUS at 17:55

## 2023-05-09 RX ADMIN — OXYCODONE HYDROCHLORIDE 10 MILLIGRAM(S): 5 TABLET ORAL at 03:35

## 2023-05-09 RX ADMIN — OXYCODONE HYDROCHLORIDE 10 MILLIGRAM(S): 5 TABLET ORAL at 10:58

## 2023-05-09 RX ADMIN — HYDROMORPHONE HYDROCHLORIDE 0.5 MILLIGRAM(S): 2 INJECTION INTRAMUSCULAR; INTRAVENOUS; SUBCUTANEOUS at 09:52

## 2023-05-09 RX ADMIN — OXYCODONE HYDROCHLORIDE 10 MILLIGRAM(S): 5 TABLET ORAL at 11:34

## 2023-05-09 RX ADMIN — HYDROMORPHONE HYDROCHLORIDE 0.5 MILLIGRAM(S): 2 INJECTION INTRAMUSCULAR; INTRAVENOUS; SUBCUTANEOUS at 08:37

## 2023-05-09 RX ADMIN — OXYCODONE HYDROCHLORIDE 10 MILLIGRAM(S): 5 TABLET ORAL at 02:35

## 2023-05-09 RX ADMIN — HYDROMORPHONE HYDROCHLORIDE 0.5 MILLIGRAM(S): 2 INJECTION INTRAMUSCULAR; INTRAVENOUS; SUBCUTANEOUS at 17:18

## 2023-05-09 RX ADMIN — HYDROMORPHONE HYDROCHLORIDE 0.5 MILLIGRAM(S): 2 INJECTION INTRAMUSCULAR; INTRAVENOUS; SUBCUTANEOUS at 22:36

## 2023-05-09 RX ADMIN — Medication 1 SPRAY(S): at 17:15

## 2023-05-09 RX ADMIN — Medication 1 SPRAY(S): at 05:09

## 2023-05-09 RX ADMIN — ENOXAPARIN SODIUM 40 MILLIGRAM(S): 100 INJECTION SUBCUTANEOUS at 17:15

## 2023-05-09 RX ADMIN — OXYCODONE HYDROCHLORIDE 10 MILLIGRAM(S): 5 TABLET ORAL at 14:59

## 2023-05-09 RX ADMIN — OXYCODONE HYDROCHLORIDE 10 MILLIGRAM(S): 5 TABLET ORAL at 15:45

## 2023-05-09 RX ADMIN — Medication 1 SPRAY(S): at 11:59

## 2023-05-09 RX ADMIN — OXYCODONE HYDROCHLORIDE 5 MILLIGRAM(S): 5 TABLET ORAL at 06:38

## 2023-05-09 RX ADMIN — HYDROMORPHONE HYDROCHLORIDE 0.5 MILLIGRAM(S): 2 INJECTION INTRAMUSCULAR; INTRAVENOUS; SUBCUTANEOUS at 12:47

## 2023-05-09 RX ADMIN — HYDROMORPHONE HYDROCHLORIDE 0.5 MILLIGRAM(S): 2 INJECTION INTRAMUSCULAR; INTRAVENOUS; SUBCUTANEOUS at 22:51

## 2023-05-09 RX ADMIN — CHLORHEXIDINE GLUCONATE 1 APPLICATION(S): 213 SOLUTION TOPICAL at 05:09

## 2023-05-09 RX ADMIN — HYDROMORPHONE HYDROCHLORIDE 0.5 MILLIGRAM(S): 2 INJECTION INTRAMUSCULAR; INTRAVENOUS; SUBCUTANEOUS at 04:45

## 2023-05-09 RX ADMIN — Medication 2000 MILLIGRAM(S): at 05:08

## 2023-05-09 RX ADMIN — OXYCODONE HYDROCHLORIDE 10 MILLIGRAM(S): 5 TABLET ORAL at 20:40

## 2023-05-09 RX ADMIN — HYDROMORPHONE HYDROCHLORIDE 0.5 MILLIGRAM(S): 2 INJECTION INTRAMUSCULAR; INTRAVENOUS; SUBCUTANEOUS at 05:00

## 2023-05-09 RX ADMIN — SENNA PLUS 2 TABLET(S): 8.6 TABLET ORAL at 22:36

## 2023-05-09 RX ADMIN — OXYCODONE HYDROCHLORIDE 10 MILLIGRAM(S): 5 TABLET ORAL at 19:40

## 2023-05-09 NOTE — PROGRESS NOTE ADULT - ASSESSMENT
39 yo male s/p transphenoidal resection of a pitutary cyst POD1 doing well on nasal saline. Headaches controlled. No vision changes. Sodium stable, pending hormonal panel/cortisol

## 2023-05-09 NOTE — PROGRESS NOTE ADULT - SUBJECTIVE AND OBJECTIVE BOX
ENT ISSUE/POD: S/p transphenoidal resection of a pituitary cyst POD1    HPI: Pt overall feels ok, slight headache, improvement in nasal congestion with saline sprays. No vision changes        PAST MEDICAL & SURGICAL HISTORY:  No pertinent past medical history      No significant past surgical history        Allergies    No Known Allergies    Intolerances      MEDICATIONS  (STANDING):  amphetamine/dextroamphetamine 30 milliGRAM(s) Oral two times a day  ARIPiprazole 2 milliGRAM(s) Oral daily  chlorhexidine 2% Cloths 1 Application(s) Topical <User Schedule>  FLUoxetine 40 milliGRAM(s) Oral daily  polyethylene glycol 3350 17 Gram(s) Oral daily  senna 2 Tablet(s) Oral at bedtime  sodium chloride 0.65% Nasal 1 Spray(s) Both Nostrils four times a day    MEDICATIONS  (PRN):  acetaminophen     Tablet .. 650 milliGRAM(s) Oral every 6 hours PRN Temp greater or equal to 38C (100.4F), Mild Pain (1 - 3)  clonazePAM  Tablet 0.5 milliGRAM(s) Oral daily PRN anxiety/panic attacks  hydrALAZINE Injectable 10 milliGRAM(s) IV Push every 2 hours PRN SBP > 160mm Hg  HYDROmorphone  Injectable 0.5 milliGRAM(s) IV Push every 4 hours PRN breakthrough pain  labetalol Injectable 10 milliGRAM(s) IV Push every 2 hours PRN Systolic blood pressure > 160  oxyCODONE    IR 5 milliGRAM(s) Oral every 4 hours PRN Moderate Pain (4 - 6)  oxyCODONE    IR 10 milliGRAM(s) Oral every 4 hours PRN Severe Pain (7 - 10)  prazosin 1 milliGRAM(s) Oral at bedtime PRN home med      ROS:   ENT: all negative except as noted in HPI   Pulm: denies SOB, cough, hemoptysis  Neuro: denies numbness/tingling, loss of sensation  Endo: denies heat/cold intolerance, excessive sweating      Vital Signs Last 24 Hrs  T(C): 37.2 (09 May 2023 07:47), Max: 37.3 (08 May 2023 16:00)  T(F): 99 (09 May 2023 07:47), Max: 99.2 (08 May 2023 16:00)  HR: 80 (09 May 2023 10:00) (63 - 93)  BP: 118/101 (09 May 2023 10:00) (118/101 - 136/91)  BP(mean): 109 (09 May 2023 10:00) (83 - 109)  RR: 28 (09 May 2023 10:00) (8 - 28)  SpO2: 99% (09 May 2023 10:00) (97% - 100%)    Parameters below as of 09 May 2023 07:00  Patient On (Oxygen Delivery Method): room air                              14.8   14.49 )-----------( 369      ( 09 May 2023 03:50 )             42.2    05-09    140  |  101  |  8.7  ----------------------------<  145<H>  4.3   |  26.0  |  0.78    Ca    9.3      09 May 2023 03:50  Phos  3.2     05-09  Mg     2.2     05-09         PHYSICAL EXAM:  Gen: NAD  Skin: No rashes, bruises, or lesions  Head: Normocephalic, Atraumatic  Face: no edema, erythema, or fluctuance. Parotid glands soft without mass  Eyes: no scleral injection  Nose: Nares bilaterally patent, no discharge  Mouth: No Stridor / Drooling / Trismus.  Mucosa moist, tongue/uvula midline, oropharynx clear  Neck: Flat, supple, no lymphadenopathy, trachea midline, no masses  Lymphatic: No lymphadenopathy  Resp: breathing easily, no stridor  Neuro: facial nerve intact, no facial droop

## 2023-05-09 NOTE — PHYSICAL THERAPY INITIAL EVALUATION ADULT - ADDITIONAL COMMENTS
Pt lives in a private home with his Mother. 2 steps to enter with a "post", no steps inside. Pt was independent PTA without an assist device. Pt owns no DME.
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
## 2023-05-09 NOTE — PHYSICAL THERAPY INITIAL EVALUATION ADULT - SENSORY TESTS
(+) eye tracking in all directions with bilateral eyes, (+) acuity in all fields; intact finger to thumb coordination BUEs

## 2023-05-09 NOTE — PATIENT PROFILE ADULT - ARRIVAL FROM
Patient appt r/s from 08/17/2021 to 09/10/2021 needs labs prior. Patient will go 08/27/2021 to [facility]. Labs ordered under NP [facility]too per Joceline Martin. Home

## 2023-05-09 NOTE — PATIENT PROFILE ADULT - FALL HARM RISK - HARM RISK INTERVENTIONS

## 2023-05-09 NOTE — PATIENT PROFILE ADULT - FUNCTIONAL ASSESSMENT - DAILY ACTIVITY SCORE.
CHIEF COMPLAINT    Chief Complaint   Patient presents with   • Chest Pain Adult       HPI    10:38 AM  57 year old female presents to the emergency department c/o chest pain onset 6 days. This has been intermittent, resolving over the weekend but returning in the last 2-3 days, constant since yesterday. She localizes this to the left chest, radiating to the shoulder. She has a dull ache and shortness of breath with this. She notices this most with deep breaths. She did recently travel to [place]. She denies recent calf swelling or pain. She was painting over the weekend but states the pain started prior and was not exacerbated by the painting; patient is right handed.     No recent cough or cold symptoms. She denies history of asthma or COPD. She does not have any sour taste in her throat or history of reflux. She denies pain with movement of the shoulder. Patient denies history of HTN. She denies DM or HTN. She has no personal history of DVT or PE.     She denies family history of DVT, PE or cardiac issues.     Allergies    ALLERGIES:  No Known Allergies    Current Medications   No current facility-administered medications for this encounter.      Current Outpatient Medications   Medication Sig Dispense Refill   • amLODIPine (NORVASC) 5 MG tablet Take 1 tablet by mouth daily. 30 tablet 5   • LORazepam (ATIVAN) 1 MG tablet Take 1/2- 1 tablet PO Q 8 hours PRN 10 tablet 0       Past Medical History    History reviewed. No pertinent past medical history.    Surgical History    Past Surgical History:   Procedure Laterality Date   • Cholecystectomy     • Hysterectomy         Social History    Social History     Tobacco Use   • Smoking status: Never Smoker   • Smokeless tobacco: Never Used   Substance Use Topics   • Alcohol use: Yes     Comment: occ   • Drug use: No       Family History    History reviewed. No pertinent family history.    REVIEW OF SYSTEMS    ALL 13 SYSTEMS REVIEWED AND NEGATIVE OR NONCONTRIBUTORY UNLESS  OTHERWISE NOTED IN HPI      PHYSICAL EXAM     ED Triage Vitals [04/26/19 1042]   ED Triage Vitals Group      Temp 98 °F (36.7 °C)      Pulse 82      Resp 18      BP (!) 177/92      SpO2 100 %      EtCO2 mmHg       Height 5' 7\" (1.702 m)      Weight 162 lb 9.6 oz (73.8 kg)      Weight Scale Used ED Actual       Gen:   AAOx3 in NAD  Head:  Normocephalic, without abnormal findings  HEENT:   PERRL, EOMI without Nystagmus. Sclera anicteric   Nose:  Clear without blood or purulent mucous   Mouth: Patent without swelling.  Moist well perfused mucous membranes  Neck: No masses, thyromegaly, posterior tenderness or meningeal signs  Resp: CTAB without wheezes, rhonchi, or rales.  CVS: RRR without significant murmur, rub or gallop  ABD: Normoactive BS, Nontender, No masses or organomegaly  Back: No CVA tenderness, deformities, swelling or ecchymosis  Ext:  No clubbing, cyanosis, or significant edema.  Equal UE/LE pulses. No joint swelling or erythema  Skin:  Well perfused, no significant rashes. No jaundice  Neuro: No focal Neuro deficits with equal UE/LE strength and sensation.  Lymph:No significant Lymphadenopathy  Psych:Alert and interactive with normal affect and interaction.      Procedures      MDM    EKG per my interpretation  Rate: 82 BPM  Rhythm: Normal sinus rhythm  Low voltage QRS  Septal infarct, age undetermined  Abnormal EKG  No previous EKGs available for comparison.     Labs  Results for orders placed or performed during the hospital encounter of 04/26/19   Comprehensive Metabolic Panel   Result Value    Sodium 142    Potassium 4.5     Comment: Slight to moderate hemolysis, result may be falsely increased.    Chloride 106    Carbon Dioxide 25    Anion Gap 16    Glucose 97    BUN 8    Creatinine 0.71    GFR Estimate,  >90     Comment: eGFR results = or >90 mL/min/1.73m2 = Normal kidney function.    GFR Estimate, Non  >90     Comment: eGFR results = or >90 mL/min/1.73m2 = Normal  kidney function.    BUN/Creatinine Ratio 11    CALCIUM 9.8    TOTAL BILIRUBIN 0.5    AST/SGOT 51 (H)     Comment: Slight to moderate hemolysis, result may be falsely increased.    ALT/SGPT 60    ALK PHOSPHATASE 72    TOTAL PROTEIN 7.9    Albumin 3.9    GLOBULIN 4.0    A/G Ratio, Serum 1.0   CBC & Auto Differential   Result Value    WBC 5.2    RBC 5.18    HGB 17.0 (H)    HCT 49.9 (H)    MCV 96.3    MCH 32.8    MCHC 34.1    RDW-CV 12.6        NRBC 0    DIFF TYPE AUTOMATED DIFFERENTIAL    Neutrophil 63    LYMPH 25    MONO 10    EOSIN 1    BASO 1    Percent Immature Granuloctyes 0    Absolute Neutrophil 3.3    Absolute Lymph 1.3    Absolute Mono 0.5    Absolute Eos 0.1    Absolute Baso 0.0    Absolute Immature Granulocytes 0.0   D Dimer Quantitative   Result Value    D Dimer Quantitative 0.43     Comment: Values <0.50 mg/L (FEU) may be useful to help exclude DVT or PE in patients at low clinical risk for these disorders.   Troponin I Ultra Sensitive   Result Value    TROPONIN I <0.02   Electrocardiogram 12-Lead   Result Value    Systolic Blood Pressure 202    Diastolic Blood Pressure 107    Ventricular Rate EKG/Min (BPM) 82    Atrial Rate (BPM) 82    LA-Interval (MSEC) 152    QRS-Interval (MSEC) 62    QT-Interval (MSEC) 366    QTc 427    P Axis (Degrees) 68    R Axis (Degrees) 46    T Axis (Degrees) 67    REPORT TEXT      Normal sinus rhythm  Low voltage QRS  Septal infarct  , age undetermined  Abnormal ECG  No previous ECGs available  Confirmed by HATTIE CHEN M.D. ([id number]),  Alva Ames ([id number]) on 4/26/2019 11:06:15 AM           Radiology  XR Chest PA and Lateral   Final Result      XR CHEST PA AND LATERAL; per my interpretation, no acute disease.        Medications - No data to display    Rechecks     ED Course as of Apr 26 1250 Fri Apr 26, 2019   1208 Patient is reassessed. She is resting comfortably. I discussed laboratory results and plan for CXR. Patient is agreeable.       1243 I discussed  radiographic findings with the patient. Reassurance is given. I discussed even though tests were reviewed and showed no acute abnormality, underlying heart disease cannot be ruled out. Return for further testing and evaluation for any further symptoms you experience.               Consults      Diagnosis:  ED Diagnosis        Final diagnosis    Chest pain, unspecified type                     Summary of your Discharge Medications      You have not been prescribed any medications.         Follow Up:  Prakash Tidwell,   [address] [place] [address]  [phone]      As needed     Patient was instructed to return to the ED immediately if symptoms worsen or any new unusual symptoms arise.     Russ Polanco MD     Recheck on patient. Discussed with patient ED findings and plan for discharge. Patient was given ED warnings, discharge instructions, and follow up information to go home with. Patient understands and agrees with plan for discharge. Any questions have been answered.      Closure:  The patient understands that this is a provisional diagnosis. Provisional diagnosis can and do change. The diagnosis that you are discharged with today is based on the symptoms with which you presented today. If any new symptoms occur or worsen, you should seek immediate attention for re-evaluation.  Any symptoms that persist or fail to completely resolve require further evaluation by your other healthcare provider(s).    This chart was documented by Sandeep Mckeon acting as a scribe for Russ Polanco MD. 4/26/2019, 10:39 AM.     The documentation recorded by the scribe accurately and completely reflects the service(s) I personally performed and the decisions made by me.           Russ Polanco MD  04/26/19 7575     24
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
## 2023-05-09 NOTE — PROVIDER CONTACT NOTE (OTHER) - DATE AND TIME:
08-May-2023 23:45 The treatment team met with pt. to review treatment plan, medication and discharge plan.  Pt. presents with no change in mental status at this time.  She continues to present as disorganized with paranoid delusions.  She remains fixated on discharge and does not otherwise engage in any meaningful dialogue. She has periods of agitation that alternate with periods of calm behavior.  TOO remains in place and pt. appears to be taking her medication.  Pt. is frequently observed pacing about the unit.  She denies any suicidal or homicidal ideations or any A/V hallucinations.    Pt. has not been accepted back to the [facility].  They state they do not believe she has shown much improvement since she was admitted to this unit.  An application for state placement in currently in progress as that is the only other viable treatment option.    Mental Status Exam:    Mood-  Labile    Sleep-  Normal    Appetite-  Normal    ADL's-  Fair    Thought process-  Disorganized/Delusional    Observation-  Routine    Pt is not stable for discharge at this time.

## 2023-05-10 LAB
ANION GAP SERPL CALC-SCNC: 10 MMOL/L — SIGNIFICANT CHANGE UP (ref 5–17)
ANION GAP SERPL CALC-SCNC: 11 MMOL/L — SIGNIFICANT CHANGE UP (ref 5–17)
ANION GAP SERPL CALC-SCNC: 9 MMOL/L — SIGNIFICANT CHANGE UP (ref 5–17)
BUN SERPL-MCNC: 5.9 MG/DL — LOW (ref 8–20)
BUN SERPL-MCNC: 6.6 MG/DL — LOW (ref 8–20)
BUN SERPL-MCNC: 9 MG/DL — SIGNIFICANT CHANGE UP (ref 8–20)
CALCIUM SERPL-MCNC: 9 MG/DL — SIGNIFICANT CHANGE UP (ref 8.4–10.5)
CALCIUM SERPL-MCNC: 9.1 MG/DL — SIGNIFICANT CHANGE UP (ref 8.4–10.5)
CALCIUM SERPL-MCNC: 9.2 MG/DL — SIGNIFICANT CHANGE UP (ref 8.4–10.5)
CHLORIDE SERPL-SCNC: 100 MMOL/L — SIGNIFICANT CHANGE UP (ref 96–108)
CHLORIDE SERPL-SCNC: 101 MMOL/L — SIGNIFICANT CHANGE UP (ref 96–108)
CHLORIDE SERPL-SCNC: 103 MMOL/L — SIGNIFICANT CHANGE UP (ref 96–108)
CO2 SERPL-SCNC: 29 MMOL/L — SIGNIFICANT CHANGE UP (ref 22–29)
CO2 SERPL-SCNC: 30 MMOL/L — HIGH (ref 22–29)
CO2 SERPL-SCNC: 31 MMOL/L — HIGH (ref 22–29)
CREAT SERPL-MCNC: 0.7 MG/DL — SIGNIFICANT CHANGE UP (ref 0.5–1.3)
CREAT SERPL-MCNC: 0.74 MG/DL — SIGNIFICANT CHANGE UP (ref 0.5–1.3)
CREAT SERPL-MCNC: 0.93 MG/DL — SIGNIFICANT CHANGE UP (ref 0.5–1.3)
EGFR: 108 ML/MIN/1.73M2 — SIGNIFICANT CHANGE UP
EGFR: 119 ML/MIN/1.73M2 — SIGNIFICANT CHANGE UP
EGFR: 121 ML/MIN/1.73M2 — SIGNIFICANT CHANGE UP
GLUCOSE SERPL-MCNC: 113 MG/DL — HIGH (ref 70–99)
GLUCOSE SERPL-MCNC: 125 MG/DL — HIGH (ref 70–99)
GLUCOSE SERPL-MCNC: 87 MG/DL — SIGNIFICANT CHANGE UP (ref 70–99)
HCT VFR BLD CALC: 38.8 % — LOW (ref 39–50)
HGB BLD-MCNC: 13.2 G/DL — SIGNIFICANT CHANGE UP (ref 13–17)
MAGNESIUM SERPL-MCNC: 2.1 MG/DL — SIGNIFICANT CHANGE UP (ref 1.6–2.6)
MCHC RBC-ENTMCNC: 32.4 PG — SIGNIFICANT CHANGE UP (ref 27–34)
MCHC RBC-ENTMCNC: 34 GM/DL — SIGNIFICANT CHANGE UP (ref 32–36)
MCV RBC AUTO: 95.1 FL — SIGNIFICANT CHANGE UP (ref 80–100)
PHOSPHATE SERPL-MCNC: 3.2 MG/DL — SIGNIFICANT CHANGE UP (ref 2.4–4.7)
PLATELET # BLD AUTO: 318 K/UL — SIGNIFICANT CHANGE UP (ref 150–400)
POTASSIUM SERPL-MCNC: 3.7 MMOL/L — SIGNIFICANT CHANGE UP (ref 3.5–5.3)
POTASSIUM SERPL-MCNC: 3.9 MMOL/L — SIGNIFICANT CHANGE UP (ref 3.5–5.3)
POTASSIUM SERPL-MCNC: 4.6 MMOL/L — SIGNIFICANT CHANGE UP (ref 3.5–5.3)
POTASSIUM SERPL-SCNC: 3.7 MMOL/L — SIGNIFICANT CHANGE UP (ref 3.5–5.3)
POTASSIUM SERPL-SCNC: 3.9 MMOL/L — SIGNIFICANT CHANGE UP (ref 3.5–5.3)
POTASSIUM SERPL-SCNC: 4.6 MMOL/L — SIGNIFICANT CHANGE UP (ref 3.5–5.3)
RBC # BLD: 4.08 M/UL — LOW (ref 4.2–5.8)
RBC # FLD: 12.6 % — SIGNIFICANT CHANGE UP (ref 10.3–14.5)
SODIUM SERPL-SCNC: 140 MMOL/L — SIGNIFICANT CHANGE UP (ref 135–145)
SODIUM SERPL-SCNC: 142 MMOL/L — SIGNIFICANT CHANGE UP (ref 135–145)
SODIUM SERPL-SCNC: 142 MMOL/L — SIGNIFICANT CHANGE UP (ref 135–145)
WBC # BLD: 13.46 K/UL — HIGH (ref 3.8–10.5)
WBC # FLD AUTO: 13.46 K/UL — HIGH (ref 3.8–10.5)

## 2023-05-10 RX ORDER — MAGNESIUM HYDROXIDE 400 MG/1
30 TABLET, CHEWABLE ORAL DAILY
Refills: 0 | Status: DISCONTINUED | OUTPATIENT
Start: 2023-05-10 | End: 2023-05-11

## 2023-05-10 RX ORDER — POLYETHYLENE GLYCOL 3350 17 G/17G
17 POWDER, FOR SOLUTION ORAL ONCE
Refills: 0 | Status: COMPLETED | OUTPATIENT
Start: 2023-05-10 | End: 2023-05-10

## 2023-05-10 RX ORDER — ACETAMINOPHEN 500 MG
1000 TABLET ORAL ONCE
Refills: 0 | Status: COMPLETED | OUTPATIENT
Start: 2023-05-10 | End: 2023-05-10

## 2023-05-10 RX ADMIN — OXYCODONE HYDROCHLORIDE 10 MILLIGRAM(S): 5 TABLET ORAL at 21:35

## 2023-05-10 RX ADMIN — OXYCODONE HYDROCHLORIDE 10 MILLIGRAM(S): 5 TABLET ORAL at 06:09

## 2023-05-10 RX ADMIN — OXYCODONE HYDROCHLORIDE 10 MILLIGRAM(S): 5 TABLET ORAL at 17:41

## 2023-05-10 RX ADMIN — OXYCODONE HYDROCHLORIDE 10 MILLIGRAM(S): 5 TABLET ORAL at 13:47

## 2023-05-10 RX ADMIN — Medication 1 SPRAY(S): at 17:13

## 2023-05-10 RX ADMIN — Medication 400 MILLIGRAM(S): at 12:20

## 2023-05-10 RX ADMIN — POLYETHYLENE GLYCOL 3350 17 GRAM(S): 17 POWDER, FOR SOLUTION ORAL at 12:30

## 2023-05-10 RX ADMIN — HYDROMORPHONE HYDROCHLORIDE 0.5 MILLIGRAM(S): 2 INJECTION INTRAMUSCULAR; INTRAVENOUS; SUBCUTANEOUS at 07:52

## 2023-05-10 RX ADMIN — OXYCODONE HYDROCHLORIDE 10 MILLIGRAM(S): 5 TABLET ORAL at 17:17

## 2023-05-10 RX ADMIN — Medication 1 SPRAY(S): at 12:29

## 2023-05-10 RX ADMIN — Medication 1 SPRAY(S): at 05:09

## 2023-05-10 RX ADMIN — Medication 1 SPRAY(S): at 00:16

## 2023-05-10 RX ADMIN — OXYCODONE HYDROCHLORIDE 10 MILLIGRAM(S): 5 TABLET ORAL at 22:35

## 2023-05-10 RX ADMIN — Medication 5 MILLIGRAM(S): at 22:18

## 2023-05-10 RX ADMIN — OXYCODONE HYDROCHLORIDE 10 MILLIGRAM(S): 5 TABLET ORAL at 05:09

## 2023-05-10 RX ADMIN — ENOXAPARIN SODIUM 40 MILLIGRAM(S): 100 INJECTION SUBCUTANEOUS at 17:14

## 2023-05-10 RX ADMIN — OXYCODONE HYDROCHLORIDE 10 MILLIGRAM(S): 5 TABLET ORAL at 09:10

## 2023-05-10 RX ADMIN — Medication 1000 MILLIGRAM(S): at 12:40

## 2023-05-10 RX ADMIN — OXYCODONE HYDROCHLORIDE 10 MILLIGRAM(S): 5 TABLET ORAL at 09:38

## 2023-05-10 RX ADMIN — OXYCODONE HYDROCHLORIDE 10 MILLIGRAM(S): 5 TABLET ORAL at 13:16

## 2023-05-10 RX ADMIN — HYDROMORPHONE HYDROCHLORIDE 0.5 MILLIGRAM(S): 2 INJECTION INTRAMUSCULAR; INTRAVENOUS; SUBCUTANEOUS at 08:07
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
## 2023-05-10 NOTE — OCCUPATIONAL THERAPY INITIAL EVALUATION ADULT - LEVEL OF INDEPENDENCE: DRESS UPPER BODY, OT EVAL
Removed catheter. Patient tolerated well. Large blood clot noted to cath tip. Patient immediately urinated a large amount of light pink tinged urine. Reports feeling 100% better.      Torie Hand RN  11/20/17 [id number]     to don gown/independent

## 2023-05-10 NOTE — PROGRESS NOTE ADULT - ASSESSMENT
39 yo male s/p transphenoidal resection of a pitutary cyst POD2 doing well on nasal saline. Headaches controlled. No vision changes.

## 2023-05-10 NOTE — PROGRESS NOTE ADULT - ASSESSMENT
This is a 38yM   Post op day # 2 Transsphenoidal Resection of Rathke cyst.     Plan  -pain control  -dvt prophylaxis on Lovenox.   -Bowel regimen  -increase activity.

## 2023-05-10 NOTE — PROGRESS NOTE ADULT - SUBJECTIVE AND OBJECTIVE BOX
ENT ISSUE/POD: S/p transphenoidal resection of a pituitary cyst POD2    HPI: Pt w slight headache, improvement in nasal congestion with saline sprays. No vision changes      PAST MEDICAL & SURGICAL HISTORY:  No pertinent past medical history      No significant past surgical history        Allergies    No Known Allergies    Intolerances      MEDICATIONS  (STANDING):  amphetamine/dextroamphetamine 30 milliGRAM(s) Oral two times a day  ARIPiprazole 2 milliGRAM(s) Oral daily  enoxaparin Injectable 40 milliGRAM(s) SubCutaneous every 24 hours  FLUoxetine 40 milliGRAM(s) Oral daily  polyethylene glycol 3350 17 Gram(s) Oral daily  senna 2 Tablet(s) Oral at bedtime  sodium chloride 0.65% Nasal 1 Spray(s) Both Nostrils four times a day    MEDICATIONS  (PRN):  acetaminophen     Tablet .. 650 milliGRAM(s) Oral every 6 hours PRN Temp greater or equal to 38C (100.4F), Mild Pain (1 - 3)  clonazePAM  Tablet 0.5 milliGRAM(s) Oral daily PRN anxiety/panic attacks  hydrALAZINE Injectable 10 milliGRAM(s) IV Push every 2 hours PRN SBP > 160mm Hg  HYDROmorphone  Injectable 0.5 milliGRAM(s) IV Push every 4 hours PRN breakthrough pain  oxyCODONE    IR 5 milliGRAM(s) Oral every 4 hours PRN Moderate Pain (4 - 6)  oxyCODONE    IR 10 milliGRAM(s) Oral every 4 hours PRN Severe Pain (7 - 10)  prazosin 1 milliGRAM(s) Oral at bedtime PRN home med    ROS:   ENT: all negative except as noted in HPI   Pulm: denies SOB, cough, hemoptysis  Neuro: denies numbness/tingling, loss of sensation  Endo: denies heat/cold intolerance, excessive sweating      Vital Signs Last 24 Hrs  T(C): 36.8 (10 May 2023 07:40), Max: 37.2 (09 May 2023 11:16)  T(F): 98.3 (10 May 2023 07:40), Max: 99 (09 May 2023 11:16)  HR: 76 (10 May 2023 08:00) (64 - 96)  BP: 123/88 (10 May 2023 08:00) (109/75 - 147/77)  BP(mean): 99 (10 May 2023 08:00) (83 - 109)  RR: 18 (10 May 2023 08:00) (11 - 28)  SpO2: 100% (10 May 2023 08:00) (96% - 100%)    Parameters below as of 10 May 2023 08:00  Patient On (Oxygen Delivery Method): room air                              13.2   13.46 )-----------( 318      ( 10 May 2023 06:28 )             38.8    05-10    142  |  103  |  5.9<L>  ----------------------------<  113<H>  3.9   |  30.0<H>  |  0.70    Ca    9.1      10 May 2023 06:28  Phos  3.2     05-10  Mg     2.1     05-10         PHYSICAL EXAM:  Gen: NAD  Skin: No rashes, bruises, or lesions  Head: Normocephalic, Atraumatic  Face: no edema, erythema, or fluctuance. Parotid glands soft without mass  Eyes: no scleral injection  Nose: Nares bilaterally patent, no discharge  Mouth: No Stridor / Drooling / Trismus.  Mucosa moist, tongue/uvula midline, oropharynx clear  Neck: Flat, supple, no lymphadenopathy, trachea midline, no masses  Lymphatic: No lymphadenopathy  Resp: breathing easily, no stridor  Neuro: facial nerve intact, no facial droop

## 2023-05-11 ENCOUNTER — TRANSCRIPTION ENCOUNTER (OUTPATIENT)
Age: 38
End: 2023-05-11

## 2023-05-11 VITALS
HEART RATE: 77 BPM | SYSTOLIC BLOOD PRESSURE: 123 MMHG | DIASTOLIC BLOOD PRESSURE: 84 MMHG | OXYGEN SATURATION: 98 % | RESPIRATION RATE: 18 BRPM | TEMPERATURE: 98 F

## 2023-05-11 LAB
ANION GAP SERPL CALC-SCNC: 12 MMOL/L — SIGNIFICANT CHANGE UP (ref 5–17)
BUN SERPL-MCNC: 8.6 MG/DL — SIGNIFICANT CHANGE UP (ref 8–20)
CALCIUM SERPL-MCNC: 9.1 MG/DL — SIGNIFICANT CHANGE UP (ref 8.4–10.5)
CHLORIDE SERPL-SCNC: 101 MMOL/L — SIGNIFICANT CHANGE UP (ref 96–108)
CO2 SERPL-SCNC: 29 MMOL/L — SIGNIFICANT CHANGE UP (ref 22–29)
CREAT SERPL-MCNC: 0.78 MG/DL — SIGNIFICANT CHANGE UP (ref 0.5–1.3)
EGFR: 117 ML/MIN/1.73M2 — SIGNIFICANT CHANGE UP
GLUCOSE SERPL-MCNC: 79 MG/DL — SIGNIFICANT CHANGE UP (ref 70–99)
POTASSIUM SERPL-MCNC: 4.1 MMOL/L — SIGNIFICANT CHANGE UP (ref 3.5–5.3)
POTASSIUM SERPL-SCNC: 4.1 MMOL/L — SIGNIFICANT CHANGE UP (ref 3.5–5.3)
SODIUM SERPL-SCNC: 142 MMOL/L — SIGNIFICANT CHANGE UP (ref 135–145)

## 2023-05-11 PROCEDURE — 84100 ASSAY OF PHOSPHORUS: CPT

## 2023-05-11 PROCEDURE — 83001 ASSAY OF GONADOTROPIN (FSH): CPT

## 2023-05-11 PROCEDURE — 83930 ASSAY OF BLOOD OSMOLALITY: CPT

## 2023-05-11 PROCEDURE — 85027 COMPLETE CBC AUTOMATED: CPT

## 2023-05-11 PROCEDURE — C9399: CPT

## 2023-05-11 PROCEDURE — 84403 ASSAY OF TOTAL TESTOSTERONE: CPT

## 2023-05-11 PROCEDURE — 83520 IMMUNOASSAY QUANT NOS NONAB: CPT

## 2023-05-11 PROCEDURE — 82533 TOTAL CORTISOL: CPT

## 2023-05-11 PROCEDURE — 81001 URINALYSIS AUTO W/SCOPE: CPT

## 2023-05-11 PROCEDURE — 83002 ASSAY OF GONADOTROPIN (LH): CPT

## 2023-05-11 PROCEDURE — 83735 ASSAY OF MAGNESIUM: CPT

## 2023-05-11 PROCEDURE — C1889: CPT

## 2023-05-11 PROCEDURE — 84443 ASSAY THYROID STIM HORMONE: CPT

## 2023-05-11 PROCEDURE — 83003 ASSAY GROWTH HORMONE (HGH): CPT

## 2023-05-11 PROCEDURE — 84436 ASSAY OF TOTAL THYROXINE: CPT

## 2023-05-11 PROCEDURE — 36415 COLL VENOUS BLD VENIPUNCTURE: CPT

## 2023-05-11 PROCEDURE — 80048 BASIC METABOLIC PNL TOTAL CA: CPT

## 2023-05-11 PROCEDURE — 84146 ASSAY OF PROLACTIN: CPT

## 2023-05-11 PROCEDURE — 70450 CT HEAD/BRAIN W/O DYE: CPT

## 2023-05-11 PROCEDURE — C1769: CPT

## 2023-05-11 PROCEDURE — 83935 ASSAY OF URINE OSMOLALITY: CPT

## 2023-05-11 PROCEDURE — C8929: CPT

## 2023-05-11 PROCEDURE — 84295 ASSAY OF SERUM SODIUM: CPT

## 2023-05-11 PROCEDURE — 84480 ASSAY TRIIODOTHYRONINE (T3): CPT

## 2023-05-11 PROCEDURE — 82024 ASSAY OF ACTH: CPT

## 2023-05-11 RX ORDER — POLYETHYLENE GLYCOL 3350 17 G/17G
17 POWDER, FOR SOLUTION ORAL
Qty: 119 | Refills: 0
Start: 2023-05-11 | End: 2023-05-17

## 2023-05-11 RX ORDER — OXYCODONE HYDROCHLORIDE 5 MG/1
1 TABLET ORAL
Qty: 12 | Refills: 0
Start: 2023-05-11 | End: 2023-05-13

## 2023-05-11 RX ORDER — SODIUM CHLORIDE 0.65 %
1 AEROSOL, SPRAY (ML) NASAL
Qty: 1 | Refills: 0
Start: 2023-05-11 | End: 2023-05-17

## 2023-05-11 RX ORDER — ACETAMINOPHEN 500 MG
2 TABLET ORAL
Qty: 0 | Refills: 0 | DISCHARGE
Start: 2023-05-11

## 2023-05-11 RX ADMIN — OXYCODONE HYDROCHLORIDE 10 MILLIGRAM(S): 5 TABLET ORAL at 10:32

## 2023-05-11 RX ADMIN — Medication 1 SPRAY(S): at 00:36

## 2023-05-11 RX ADMIN — OXYCODONE HYDROCHLORIDE 10 MILLIGRAM(S): 5 TABLET ORAL at 06:32

## 2023-05-11 RX ADMIN — OXYCODONE HYDROCHLORIDE 10 MILLIGRAM(S): 5 TABLET ORAL at 01:45

## 2023-05-11 RX ADMIN — OXYCODONE HYDROCHLORIDE 10 MILLIGRAM(S): 5 TABLET ORAL at 07:07

## 2023-05-11 RX ADMIN — OXYCODONE HYDROCHLORIDE 10 MILLIGRAM(S): 5 TABLET ORAL at 02:45

## 2023-05-11 RX ADMIN — Medication 1 SPRAY(S): at 06:32

## 2023-05-11 RX ADMIN — Medication 1 SPRAY(S): at 12:17
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
## 2023-05-11 NOTE — PROGRESS NOTE ADULT - SUBJECTIVE AND OBJECTIVE BOX
ENT ISSUE/POD: S/p transphenoidal resection of a pituitary cyst POD3    HPI: Pt w slight headache controlled when receiving pain omedicaiton, continues to use nasal saline sprays. No vision changes, no leaking from nares or salty/metallic taste      PAST MEDICAL & SURGICAL HISTORY:  No pertinent past medical history      No significant past surgical history        Allergies    No Known Allergies    Intolerances      MEDICATIONS  (STANDING):  amphetamine/dextroamphetamine 30 milliGRAM(s) Oral two times a day  bisacodyl 5 milliGRAM(s) Oral at bedtime  enoxaparin Injectable 40 milliGRAM(s) SubCutaneous every 24 hours  polyethylene glycol 3350 17 Gram(s) Oral daily  senna 2 Tablet(s) Oral at bedtime  sodium chloride 0.65% Nasal 1 Spray(s) Both Nostrils four times a day    MEDICATIONS  (PRN):  acetaminophen     Tablet .. 650 milliGRAM(s) Oral every 6 hours PRN Temp greater or equal to 38C (100.4F), Mild Pain (1 - 3)  clonazePAM  Tablet 0.5 milliGRAM(s) Oral daily PRN anxiety/panic attacks  hydrALAZINE Injectable 10 milliGRAM(s) IV Push every 2 hours PRN SBP > 160mm Hg  HYDROmorphone  Injectable 0.5 milliGRAM(s) IV Push every 4 hours PRN breakthrough pain  magnesium hydroxide Suspension 30 milliLiter(s) Oral daily PRN Constipation  oxyCODONE    IR 5 milliGRAM(s) Oral every 4 hours PRN Moderate Pain (4 - 6)  oxyCODONE    IR 10 milliGRAM(s) Oral every 4 hours PRN Severe Pain (7 - 10)  prazosin 1 milliGRAM(s) Oral at bedtime PRN home med      ROS:   ENT: all negative except as noted in HPI   Pulm: denies SOB, cough, hemoptysis  Neuro: denies numbness/tingling, loss of sensation  Endo: denies heat/cold intolerance, excessive sweating      Vital Signs Last 24 Hrs  T(C): 36.8 (11 May 2023 08:00), Max: 36.8 (10 May 2023 15:37)  T(F): 98.3 (11 May 2023 08:00), Max: 98.3 (10 May 2023 15:37)  HR: 72 (11 May 2023 08:00) (61 - 81)  BP: 127/90 (11 May 2023 08:00) (104/74 - 128/95)  BP(mean): 103 (11 May 2023 08:00) (83 - 106)  RR: 18 (11 May 2023 08:00) (16 - 20)  SpO2: 100% (11 May 2023 08:00) (97% - 100%)    Parameters below as of 11 May 2023 08:00  Patient On (Oxygen Delivery Method): room air                              13.2   13.46 )-----------( 318      ( 10 May 2023 06:28 )             38.8    05-11    142  |  101  |  8.6  ----------------------------<  79  4.1   |  29.0  |  0.78    Ca    9.1      11 May 2023 05:24  Phos  3.2     05-10  Mg     2.1     05-10         PHYSICAL EXAM:  Gen: NAD  Skin: No rashes, bruises, or lesions  Head: Normocephalic, Atraumatic  Face: no edema, erythema, or fluctuance. Parotid glands soft without mass  Eyes: no scleral injection  Nose: Nares bilaterally patent, no discharge  Mouth: No Stridor / Drooling / Trismus.  Mucosa moist, tongue/uvula midline, oropharynx clear  Neck: Flat, supple, no lymphadenopathy, trachea midline, no masses  Lymphatic: No lymphadenopathy  Resp: breathing easily, no stridor  Neuro: facial nerve intact, no facial droop

## 2023-05-11 NOTE — DISCHARGE NOTE PROVIDER - NSDCCPCAREPLAN_GEN_ALL_CORE_FT
PRINCIPAL DISCHARGE DIAGNOSIS  Diagnosis: Rathke's cleft cyst  Assessment and Plan of Treatment:

## 2023-05-11 NOTE — DISCHARGE NOTE PROVIDER - PROVIDER TOKENS
PROVIDER:[TOKEN:[[id number]],FOLLOWUP:[2 weeks]],PROVIDER:[TOKEN:[[id number]],FOLLOWUP:[2 weeks]]

## 2023-05-11 NOTE — DISCHARGE NOTE PROVIDER - NSDCCPTREATMENT_GEN_ALL_CORE_FT
PRINCIPAL PROCEDURE  Procedure: Excision, neoplasm or cyst, pituitary, endoscopic, transsphenoidal approach  Findings and Treatment:

## 2023-05-11 NOTE — DISCHARGE NOTE NURSING/CASE MANAGEMENT/SOCIAL WORK - PATIENT PORTAL LINK FT
You can access the FollowMyHealth Patient Portal offered by [facility] by registering at the following website: [email or web address]. By joining [facility]’s FollowMyHealth portal, you will also be able to view your health information using other applications (apps) compatible with our system.

## 2023-05-11 NOTE — PROGRESS NOTE ADULT - ASSESSMENT
39 yo male s/p transphenoidal resection of a pitutary cyst POD3 doing well on nasal saline. Headaches controlled. No vision changes.
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
## 2023-05-11 NOTE — DISCHARGE NOTE PROVIDER - NSDCMRMEDTOKEN_GEN_ALL_CORE_FT
Abilify 2 mg oral tablet: 1 tab(s) orally once a day  acetaminophen 325 mg oral tablet: 2 tab(s) orally every 6 hours As needed Temp greater or equal to 38C (100.4F), Mild Pain (1 - 3)  clonazePAM 0.5 mg oral tablet: 1 tab(s) orally once a day as needed for panic attacks/anxiety  dextroamphetamine-amphetamine 30 mg oral tablet: 1 tab(s) orally 2 times a day  FLUoxetine 40 mg oral capsule: 1 cap(s) orally once a day  polyethylene glycol 3350 oral powder for reconstitution: 17 gram(s) orally once a day as needed for  constipation  prazosin 1 mg oral capsule: 1 orally once a day (at bedtime) as needed for as needed  QUEtiapine 50 mg oral tablet: 1 tab(s) orally once a day (at bedtime) as needed for as needed  sodium chloride 0.65% nasal spray: 1 spray(s) nasal 3 times a day

## 2023-05-11 NOTE — DISCHARGE NOTE PROVIDER - CARE PROVIDER_API CALL
Jose Chinchilla; PhD)  Neurosurgery  [address] [place] [address]  Phone: [phone]  Fax: [phone]  Follow Up Time: 2 weeks    Tommy Aguilar)  [facility]  [facility]– [place][note [address]] [place] [address]  Phone: [phone]  Fax: [phone]  Follow Up Time: 2 weeks
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
## 2023-05-11 NOTE — PROGRESS NOTE ADULT - PROBLEM SELECTOR PROBLEM 1
Other disorders of pituitary gland

## 2023-05-11 NOTE — DISCHARGE NOTE PROVIDER - NSDCFUADDINST_GEN_ALL_CORE_FT
Do not have any nasal swabs performed including covid, flu swab, MRSA until cleared by surgeon as the swabs may result in CSF leak/infection. Avoid blowing your nose or use of straw to drink. Avoid sneezing, trauma, or straining nose. Call your neurosurgeon if you experience any persistent leaking from nose.     If you experience any new or worsening symptoms, please contact your physician and return to the ED.

## 2023-05-11 NOTE — PROGRESS NOTE ADULT - PROBLEM SELECTOR PLAN 1
-Skullbase precautions: NO ngtube, no covid swabs, no nose blowing, no heavy lifting, no bending head below the waist  -Nasal saline sprays often  -Neilmed sinus irrigations on DC
-Skullbase precautions: NO ngtube, no covid swabs, no nose blowing, no heavy lifting, no bending head below the waist  -Nasal saline sprays often  -Neilmed sinus irrigations on DC. Neilmed handout/education given
-Skullbase precautions: NO ngtube, no covid swabs, no nose blowing, no heavy lifting, no bending head below the waist  -Nasal saline sprays often  -Neilmed sinus irrigations on DC.
- Start saline spray 1 spray each nostril QID, will transition to sinus rinses at discharge   - Continue humidified face tent if supplemental O2 warranted to prevent nasal passages from drying out  - ENT will continue to follow daily, call [phone] for further questions

## 2023-05-11 NOTE — DISCHARGE NOTE NURSING/CASE MANAGEMENT/SOCIAL WORK - NSDCPEFALRISK_GEN_ALL_CORE
For information on Fall & Injury Prevention, visit: [email or web address] OR  [email or web address] OR  https://www.cdc.gov/steadi/patient.html

## 2023-05-17 LAB — GHBP SERPL-SCNC: 14 NG/ML — SIGNIFICANT CHANGE UP

## 2023-06-09 ENCOUNTER — APPOINTMENT (OUTPATIENT)
Dept: NEUROLOGY | Facility: CLINIC | Age: 38
End: 2023-06-09
Payer: MEDICAID

## 2023-06-09 ENCOUNTER — APPOINTMENT (OUTPATIENT)
Dept: NEUROSURGERY | Facility: CLINIC | Age: 38
End: 2023-06-09
Payer: MEDICAID

## 2023-06-09 ENCOUNTER — NON-APPOINTMENT (OUTPATIENT)
Age: 38
End: 2023-06-09

## 2023-06-09 VITALS
OXYGEN SATURATION: 97 % | HEART RATE: 90 BPM | BODY MASS INDEX: 24.34 KG/M2 | WEIGHT: 170 LBS | TEMPERATURE: 97.9 F | HEIGHT: 70 IN | DIASTOLIC BLOOD PRESSURE: 70 MMHG | SYSTOLIC BLOOD PRESSURE: 110 MMHG

## 2023-06-09 VITALS
BODY MASS INDEX: 24.34 KG/M2 | WEIGHT: 170 LBS | HEIGHT: 70 IN | SYSTOLIC BLOOD PRESSURE: 110 MMHG | DIASTOLIC BLOOD PRESSURE: 68 MMHG

## 2023-06-09 DIAGNOSIS — E23.7 DISORDER OF PITUITARY GLAND, UNSPECIFIED: ICD-10-CM

## 2023-06-09 DIAGNOSIS — R51.9 HEADACHE, UNSPECIFIED: ICD-10-CM

## 2023-06-09 PROCEDURE — 99204 OFFICE O/P NEW MOD 45 MIN: CPT

## 2023-06-09 PROCEDURE — 99024 POSTOP FOLLOW-UP VISIT: CPT

## 2023-06-09 NOTE — ASSESSMENT
[FreeTextEntry1] : Status post Rathke cyst drainage and septoplasty\par All of his symptoms have resolved except for significant insomnia\par No further headaches or fainting episodes\par I am suggesting evaluation by sleep disorder specialist\par \par I will be happy to see him back for any new or concerning symptoms

## 2023-06-09 NOTE — PHYSICAL EXAM
[General Appearance - Alert] : alert [General Appearance - In No Acute Distress] : in no acute distress [General Appearance - Well-Appearing] : healthy appearing [Oriented To Time, Place, And Person] : oriented to person, place, and time [Impaired Insight] : insight and judgment were intact [Affect] : the affect was normal [Memory Recent] : recent memory was not impaired [FreeTextEntry1] : Short-term recall 3/3. [Person] : oriented to person [Place] : oriented to place [Time] : oriented to time [Concentration Intact] : normal concentrating ability [Fluency] : fluency intact [Comprehension] : comprehension intact [Past History] : adequate knowledge of personal past history [Cranial Nerves Optic (II)] : visual acuity intact bilaterally,  visual fields full to confrontation, pupils equal round and reactive to light [Cranial Nerves Oculomotor (III)] : extraocular motion intact [Cranial Nerves Trigeminal (V)] : facial sensation intact symmetrically [Cranial Nerves Facial (VII)] : face symmetrical [Cranial Nerves Vestibulocochlear (VIII)] : hearing was intact bilaterally [Cranial Nerves Glossopharyngeal (IX)] : tongue and palate midline [Cranial Nerves Accessory (XI - Cranial And Spinal)] : head turning and shoulder shrug symmetric [Cranial Nerves Hypoglossal (XII)] : there was no tongue deviation with protrusion [Motor Tone] : muscle tone was normal in all four extremities [Motor Strength] : muscle strength was normal in all four extremities [No Muscle Atrophy] : normal bulk in all four extremities [Paresis Pronator Drift Right-Sided] : no pronator drift on the right [Paresis Pronator Drift Left-Sided] : no pronator drift on the left [Sensation Tactile Decrease] : light touch was intact [Sensation Pain / Temperature Decrease] : pain and temperature was intact [Romberg's Sign] : Romberg's sign was negtive [Abnormal Walk] : normal gait [Balance] : balance was intact [Past-pointing] : there was no past-pointing [Tremor] : no tremor present [Coordination - Dysmetria Impaired Finger-to-Nose Bilateral] : not present [Coordination - Dysmetria Impaired Heel-to-Shin Bilateral] : not present [2+] : Ankle jerk left 2+ [Plantar Reflex Right Only] : normal on the right [Plantar Reflex Left Only] : normal on the left [PERRL With Normal Accommodation] : pupils were equal in size, round, reactive to light, with normal accommodation [Extraocular Movements] : extraocular movements were intact [Full Visual Field] : full visual field

## 2023-06-09 NOTE — PHYSICAL EXAM
[General Appearance - Alert] : alert [General Appearance - In No Acute Distress] : in no acute distress [Oriented To Time, Place, And Person] : oriented to person, place, and time [Impaired Insight] : insight and judgment were intact [Affect] : the affect was normal [Person] : oriented to person [Place] : oriented to place [Time] : oriented to time [Short Term Intact] : short term memory intact [Remote Intact] : remote memory intact [Span Intact] : the attention span was normal [Concentration Intact] : normal concentrating ability [Fluency] : fluency intact [Comprehension] : comprehension intact [Current Events] : adequate knowledge of current events [Past History] : adequate knowledge of personal past history [Vocabulary] : adequate range of vocabulary [Cranial Nerves Optic (II)] : visual acuity intact bilaterally,  pupils equal round and reactive to light [Cranial Nerves Oculomotor (III)] : extraocular motion intact [Cranial Nerves Trigeminal (V)] : facial sensation intact symmetrically [Cranial Nerves Facial (VII)] : face symmetrical [Cranial Nerves Vestibulocochlear (VIII)] : hearing was intact bilaterally [Cranial Nerves Glossopharyngeal (IX)] : tongue and palate midline [Cranial Nerves Accessory (XI - Cranial And Spinal)] : head turning and shoulder shrug symmetric [Cranial Nerves Hypoglossal (XII)] : there was no tongue deviation with protrusion [Motor Tone] : muscle tone was normal in all four extremities [Motor Strength] : muscle strength was normal in all four extremities [No Muscle Atrophy] : normal bulk in all four extremities [Motor Handedness Right-Handed] : the patient is right hand dominant [Sensation Tactile Decrease] : light touch was intact [Balance] : balance was intact [Limited Balance] : balance was intact [Past-pointing] : there was no past-pointing [Tremor] : no tremor present [Coordination - Dysmetria Impaired Finger-to-Nose Bilateral] : not present [2+] : Patella left 2+ [FreeTextEntry6] : No pronator drift [No Visual Abnormalities] : no visible abnormailities [Extraocular Movements] : extraocular movements were intact [Outer Ear] : the ears and nose were normal in appearance [] : no respiratory distress [Respiration, Rhythm And Depth] : normal respiratory rhythm and effort [Exaggerated Use Of Accessory Muscles For Inspiration] : no accessory muscle use [Heart Rate And Rhythm] : heart rate was normal and rhythm regular [Abnormal Walk] : normal gait [Involuntary Movements] : no involuntary movements were seen [Skin Color & Pigmentation] : normal skin color and pigmentation [Skin Turgor] : normal skin turgor

## 2023-06-09 NOTE — REVIEW OF SYSTEMS
[Sleep Disturbances] : sleep disturbances [Anxiety] : anxiety [Depression] : depression [As Noted in HPI] : as noted in HPI [Numbness] : no numbness [Tingling] : no tingling [Dizziness] : dizziness [Fainting] : fainting [Lightheadedness] : lightheadedness [Tension Headache] : tension-type headaches [Negative] : Heme/Lymph
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
## 2023-06-09 NOTE — ASSESSMENT
[FreeTextEntry1] : Mr. Castro is s/p trans sphenoidal pituitary resection 5/8/2023.  He is doing well from post operative perspective. No complaints. today.  He will see ENT.\par Plan:\par Repeat MRI brain focus on sella w/w contrast at 4 month brianna 9/2023.\par F/u with Dr. Corea\par Patient has been given an opportunity to ask and have their questions answered to their satisfaction.\par Patient knows to call the office if there are any new or worsening symptoms.\par
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
## 2023-07-17 ENCOUNTER — APPOINTMENT (OUTPATIENT)
Dept: CT IMAGING | Facility: CLINIC | Age: 38
End: 2023-07-17
Payer: MEDICAID

## 2023-07-17 ENCOUNTER — OUTPATIENT (OUTPATIENT)
Dept: OUTPATIENT SERVICES | Facility: HOSPITAL | Age: 38
LOS: 1 days | End: 2023-07-17

## 2023-07-17 DIAGNOSIS — E23.7 DISORDER OF PITUITARY GLAND, UNSPECIFIED: ICD-10-CM

## 2023-07-17 PROCEDURE — 70450 CT HEAD/BRAIN W/O DYE: CPT | Mod: 26

## 2023-07-28 ENCOUNTER — APPOINTMENT (OUTPATIENT)
Dept: NEUROLOGY | Facility: CLINIC | Age: 38
End: 2023-07-28
Payer: MEDICAID

## 2023-07-28 ENCOUNTER — LABORATORY RESULT (OUTPATIENT)
Age: 38
End: 2023-07-28

## 2023-07-28 VITALS
SYSTOLIC BLOOD PRESSURE: 122 MMHG | BODY MASS INDEX: 21.47 KG/M2 | HEIGHT: 70 IN | DIASTOLIC BLOOD PRESSURE: 82 MMHG | HEART RATE: 64 BPM | TEMPERATURE: 98.2 F | WEIGHT: 150 LBS

## 2023-07-28 DIAGNOSIS — G47.00 INSOMNIA, UNSPECIFIED: ICD-10-CM

## 2023-07-28 DIAGNOSIS — G25.81 RESTLESS LEGS SYNDROME: ICD-10-CM

## 2023-07-28 DIAGNOSIS — R55 SYNCOPE AND COLLAPSE: ICD-10-CM

## 2023-07-28 PROCEDURE — 99214 OFFICE O/P EST MOD 30 MIN: CPT

## 2023-07-28 NOTE — DISCUSSION/SUMMARY
[FreeTextEntry1] : Mr. Martel is a 38 year old man who reports having one year of frequent episodes of dizziness at times with loss of consciousness and severe insomnia.\par \par Loss of consciousness\par -Based on the description of events, the etiology is unclear\par -follow-up with cardiology for event monitor\par -48 hour EEG to evaluate for any epileptiform activity\par -Avoid driving at this time\par -He has a family history (sibling) with autoimmune encephalitis. I am not ordering an LP at this time, but will check ESR, CRP and voltage gated potassium channel antibodies. \par -Repeat MRI brain with and without contrast to follow-up on nonspecific areas of signal hyperintensity seen on previous MRI\par \par Insomnia\par We discussed the following recommendations to improve sleep hygiene and insomnia.\par - The bed should only be used for sleep and intimacy. No reading or watching television in bed.\par -  Avoid trying to sleep/go to bed only when sleepy. If you cannot fall asleep at the beginning of the night or in the middle of the night get out of bed after 15 minutes and do something relaxing (read, watch television, etc.)\par -  Wake up at the same time every day.\par -  No naps during the day\par -  No caffeine after noon \par -  Do not watch the clock at night.\par - Avoid direct sunlight late in the day/ wear sunglasses after 4 PM\par - Do not use the computer/tablets for 1-2 hours prior to bedtime\par - Schedule thinking time early in the evening and have relaxation time for one hour before bed\par - Practice relaxation training that can be done at night if you cannot sleep\par - Exercise for 20 minutes in the late afternoon/early evening or take a hot bath 2-4 hours before bedtime\par \par He has tried multiple medications including zolpidem, Lunesta, quetiapine, Trazodone, clonazepam.\par Will prescribe Belsomra 10 mg hs. If not covered by insurance we can try Remeron.\par \par He may also have restless legs syndrome contributing to his insomnia.\par I am ordering blood tests to look for other causes including ferritin levels, vitamin B12 levels, magnesium. TSH has been normal.\par \par f/u after above testing, sooner if needed.

## 2023-07-28 NOTE — PHYSICAL EXAM
[FreeTextEntry1] : Examination:\par Constitutional: normal, no apparent distress\par Eyes: normal conjunctiva b/l, no ptosis, visual fields full\par Respiratory: no respiratory distress, normal effort, normal auscultation\par Cardiovascular: normal rate, rhythm, no murmurs\par Neck: supple, no masses\par Vascular: carotids normal\par Skin: normal color, no rashes\par Psych: irritable at times\par \par Neurological:\par Memory: normal memory, oriented to person, place, time\par Language intact/no aphasia\par Cranial Nerves: II-XII intact, Pupils equally round and reactive to light, ocular muscles/movements intact, no ptosis, no facial weakness, tongue protrudes normally in the midline, \par Motor: normal tone, no pronator drift, full strength in all four extremities in the proximal and distal muscle groups\par Coordination: Fine motor movements intact, rapid alternating movements intact, finger to nose intact bilaterally\par Sensory: intact to light touch, vibration, joint position sense, negative Romberg examination\par DTRs: symmetric, 2+ in b/l triceps, 2+ in b/l biceps, 2+ in b/l brachioradialis, 2+ in bilateral patellars, 2+ in bilateral Achilles, Babinskis negative bilaterally\par Gait: narrow based, steady, able to walk on heels, toes, tandem gait\par \par
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
## 2023-07-28 NOTE — DATA REVIEWED
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 The pituitary stalk remains midline. No compression upon the optic chiasm. There is normal pituitary gland lateral to the Rathke cleft cyst.\par \par There is subtle nonspecific patchy FLAIR hyperintense signal in the periventricular white matter, may reflect artifact, however may reflect a demyelinating process versus other white matter etiologies in a patient of this age. Additionally, there is a focal 3 mm focus of FLAIR hyperintense signal in the right anterior centrum semiovale, with the same differential.\par

## 2023-08-01 ENCOUNTER — NON-APPOINTMENT (OUTPATIENT)
Age: 38
End: 2023-08-01

## 2023-08-01 ENCOUNTER — APPOINTMENT (OUTPATIENT)
Dept: NEUROSURGERY | Facility: CLINIC | Age: 38
End: 2023-08-01
Payer: MEDICAID

## 2023-08-01 PROCEDURE — 99024 POSTOP FOLLOW-UP VISIT: CPT

## 2023-08-04 ENCOUNTER — NON-APPOINTMENT (OUTPATIENT)
Age: 38
End: 2023-08-04

## 2023-08-14 ENCOUNTER — APPOINTMENT (OUTPATIENT)
Dept: MRI IMAGING | Facility: CLINIC | Age: 38
End: 2023-08-14

## 2023-08-14 DIAGNOSIS — R90.89 OTHER ABNORMAL FINDINGS ON DIAGNOSTIC IMAGING OF CENTRAL NERVOUS SYSTEM: ICD-10-CM

## 2023-08-15 ENCOUNTER — APPOINTMENT (OUTPATIENT)
Dept: NEUROLOGY | Facility: CLINIC | Age: 38
End: 2023-08-15
Payer: MEDICAID

## 2023-08-15 PROCEDURE — 95816 EEG AWAKE AND DROWSY: CPT

## 2023-08-17 ENCOUNTER — APPOINTMENT (OUTPATIENT)
Dept: NEUROLOGY | Facility: CLINIC | Age: 38
End: 2023-08-17
Payer: MEDICAID

## 2023-08-17 PROCEDURE — 95721 EEG PHY/QHP>36<60 HR W/O VID: CPT

## 2023-08-17 PROCEDURE — 95708 EEG WO VID EA 12-26HR UNMNTR: CPT

## 2023-08-29 ENCOUNTER — APPOINTMENT (OUTPATIENT)
Dept: NEUROSURGERY | Facility: CLINIC | Age: 38
End: 2023-08-29
Payer: MEDICAID

## 2023-08-29 VITALS
HEART RATE: 66 BPM | WEIGHT: 150 LBS | BODY MASS INDEX: 21.47 KG/M2 | TEMPERATURE: 97.2 F | HEIGHT: 70 IN | SYSTOLIC BLOOD PRESSURE: 130 MMHG | DIASTOLIC BLOOD PRESSURE: 86 MMHG

## 2023-08-29 PROCEDURE — 99215 OFFICE O/P EST HI 40 MIN: CPT

## 2023-09-13 ENCOUNTER — APPOINTMENT (OUTPATIENT)
Dept: NEUROSURGERY | Facility: CLINIC | Age: 38
End: 2023-09-13
Payer: MEDICAID

## 2023-09-13 VITALS
TEMPERATURE: 97.3 F | DIASTOLIC BLOOD PRESSURE: 88 MMHG | HEIGHT: 70 IN | WEIGHT: 145 LBS | OXYGEN SATURATION: 99 % | HEART RATE: 80 BPM | BODY MASS INDEX: 20.76 KG/M2 | SYSTOLIC BLOOD PRESSURE: 132 MMHG

## 2023-09-13 PROCEDURE — 99214 OFFICE O/P EST MOD 30 MIN: CPT

## 2023-09-17 ENCOUNTER — TRANSCRIPTION ENCOUNTER (OUTPATIENT)
Age: 38
End: 2023-09-17

## 2023-09-19 ENCOUNTER — NON-APPOINTMENT (OUTPATIENT)
Age: 38
End: 2023-09-19

## 2023-09-21 ENCOUNTER — APPOINTMENT (OUTPATIENT)
Dept: CARDIOLOGY | Facility: CLINIC | Age: 38
End: 2023-09-21
Payer: MEDICAID

## 2023-09-21 ENCOUNTER — NON-APPOINTMENT (OUTPATIENT)
Age: 38
End: 2023-09-21

## 2023-09-21 VITALS
BODY MASS INDEX: 22.9 KG/M2 | HEIGHT: 70 IN | HEART RATE: 73 BPM | OXYGEN SATURATION: 97 % | SYSTOLIC BLOOD PRESSURE: 114 MMHG | WEIGHT: 160 LBS | DIASTOLIC BLOOD PRESSURE: 78 MMHG

## 2023-09-21 DIAGNOSIS — Z01.810 ENCOUNTER FOR PREPROCEDURAL CARDIOVASCULAR EXAMINATION: ICD-10-CM

## 2023-09-21 PROCEDURE — 99214 OFFICE O/P EST MOD 30 MIN: CPT | Mod: 25

## 2023-09-21 PROCEDURE — 93000 ELECTROCARDIOGRAM COMPLETE: CPT | Mod: NC

## 2023-09-21 RX ORDER — MIRTAZAPINE 15 MG/1
15 TABLET, FILM COATED ORAL
Qty: 30 | Refills: 2 | Status: DISCONTINUED | COMMUNITY
Start: 2023-08-04 | End: 2023-09-21

## 2023-09-21 RX ORDER — SUVOREXANT 10 MG/1
10 TABLET, FILM COATED ORAL
Qty: 30 | Refills: 3 | Status: DISCONTINUED | COMMUNITY
Start: 2023-07-28 | End: 2023-09-21

## 2023-09-23 ENCOUNTER — APPOINTMENT (OUTPATIENT)
Dept: MRI IMAGING | Facility: CLINIC | Age: 38
End: 2023-09-23

## 2023-09-23 ENCOUNTER — OUTPATIENT (OUTPATIENT)
Dept: OUTPATIENT SERVICES | Facility: HOSPITAL | Age: 38
LOS: 1 days | End: 2023-09-23
Payer: MEDICAID

## 2023-09-23 DIAGNOSIS — Q04.6 CONGENITAL CEREBRAL CYSTS: ICD-10-CM

## 2023-09-23 PROCEDURE — 70553 MRI BRAIN STEM W/O & W/DYE: CPT | Mod: 26

## 2023-10-02 ENCOUNTER — TRANSCRIPTION ENCOUNTER (OUTPATIENT)
Age: 38
End: 2023-10-02

## 2023-10-12 ENCOUNTER — OUTPATIENT (OUTPATIENT)
Dept: OUTPATIENT SERVICES | Facility: HOSPITAL | Age: 38
LOS: 1 days | End: 2023-10-12
Payer: MEDICAID

## 2023-10-12 VITALS
HEIGHT: 70 IN | RESPIRATION RATE: 20 BRPM | HEART RATE: 62 BPM | TEMPERATURE: 97 F | DIASTOLIC BLOOD PRESSURE: 70 MMHG | SYSTOLIC BLOOD PRESSURE: 110 MMHG | WEIGHT: 162.48 LBS | OXYGEN SATURATION: 98 %

## 2023-10-12 DIAGNOSIS — Z13.89 ENCOUNTER FOR SCREENING FOR OTHER DISORDER: ICD-10-CM

## 2023-10-12 DIAGNOSIS — Z29.9 ENCOUNTER FOR PROPHYLACTIC MEASURES, UNSPECIFIED: ICD-10-CM

## 2023-10-12 DIAGNOSIS — Z01.818 ENCOUNTER FOR OTHER PREPROCEDURAL EXAMINATION: ICD-10-CM

## 2023-10-12 DIAGNOSIS — E23.6 OTHER DISORDERS OF PITUITARY GLAND: Chronic | ICD-10-CM

## 2023-10-12 DIAGNOSIS — Q04.6 CONGENITAL CEREBRAL CYSTS: ICD-10-CM

## 2023-10-12 LAB
A1C WITH ESTIMATED AVERAGE GLUCOSE RESULT: 4.9 % — SIGNIFICANT CHANGE UP (ref 4–5.6)
ALBUMIN SERPL ELPH-MCNC: 4.8 G/DL — SIGNIFICANT CHANGE UP (ref 3.3–5.2)
ALP SERPL-CCNC: 51 U/L — SIGNIFICANT CHANGE UP (ref 40–120)
ALT FLD-CCNC: 11 U/L — SIGNIFICANT CHANGE UP
ANION GAP SERPL CALC-SCNC: 11 MMOL/L — SIGNIFICANT CHANGE UP (ref 5–17)
APTT BLD: 31.5 SEC — SIGNIFICANT CHANGE UP (ref 24.5–35.6)
APTT BLD: 31.5 SEC — SIGNIFICANT CHANGE UP (ref 24.5–35.6)
AST SERPL-CCNC: 17 U/L — SIGNIFICANT CHANGE UP
BASOPHILS # BLD AUTO: 0.11 K/UL — SIGNIFICANT CHANGE UP (ref 0–0.2)
BASOPHILS # BLD AUTO: 0.11 K/UL — SIGNIFICANT CHANGE UP (ref 0–0.2)
BASOPHILS NFR BLD AUTO: 2 % — SIGNIFICANT CHANGE UP (ref 0–2)
BASOPHILS NFR BLD AUTO: 2 % — SIGNIFICANT CHANGE UP (ref 0–2)
BILIRUB SERPL-MCNC: 0.5 MG/DL — SIGNIFICANT CHANGE UP (ref 0.4–2)
BLD GP AB SCN SERPL QL: SIGNIFICANT CHANGE UP
BLD GP AB SCN SERPL QL: SIGNIFICANT CHANGE UP
BUN SERPL-MCNC: 17.4 MG/DL — SIGNIFICANT CHANGE UP (ref 8–20)
CALCIUM SERPL-MCNC: 9.5 MG/DL — SIGNIFICANT CHANGE UP (ref 8.4–10.5)
CHLORIDE SERPL-SCNC: 100 MMOL/L — SIGNIFICANT CHANGE UP (ref 96–108)
CO2 SERPL-SCNC: 29 MMOL/L — SIGNIFICANT CHANGE UP (ref 22–29)
CREAT SERPL-MCNC: 0.65 MG/DL — SIGNIFICANT CHANGE UP (ref 0.5–1.3)
EGFR: 124 ML/MIN/1.73M2 — SIGNIFICANT CHANGE UP
EOSINOPHIL # BLD AUTO: 0.45 K/UL — SIGNIFICANT CHANGE UP (ref 0–0.5)
EOSINOPHIL # BLD AUTO: 0.45 K/UL — SIGNIFICANT CHANGE UP (ref 0–0.5)
EOSINOPHIL NFR BLD AUTO: 8 % — HIGH (ref 0–6)
EOSINOPHIL NFR BLD AUTO: 8 % — HIGH (ref 0–6)
ESTIMATED AVERAGE GLUCOSE: 94 MG/DL — SIGNIFICANT CHANGE UP (ref 68–114)
GLUCOSE SERPL-MCNC: 86 MG/DL — SIGNIFICANT CHANGE UP (ref 70–99)
HCT VFR BLD CALC: 46.3 % — SIGNIFICANT CHANGE UP (ref 39–50)
HCT VFR BLD CALC: 46.3 % — SIGNIFICANT CHANGE UP (ref 39–50)
HGB BLD-MCNC: 15.9 G/DL — SIGNIFICANT CHANGE UP (ref 13–17)
HGB BLD-MCNC: 15.9 G/DL — SIGNIFICANT CHANGE UP (ref 13–17)
IMM GRANULOCYTES NFR BLD AUTO: 0.4 % — SIGNIFICANT CHANGE UP (ref 0–0.9)
IMM GRANULOCYTES NFR BLD AUTO: 0.4 % — SIGNIFICANT CHANGE UP (ref 0–0.9)
INR BLD: 0.93 RATIO — SIGNIFICANT CHANGE UP (ref 0.85–1.18)
INR BLD: 0.93 RATIO — SIGNIFICANT CHANGE UP (ref 0.85–1.18)
LYMPHOCYTES # BLD AUTO: 1.63 K/UL — SIGNIFICANT CHANGE UP (ref 1–3.3)
LYMPHOCYTES # BLD AUTO: 1.63 K/UL — SIGNIFICANT CHANGE UP (ref 1–3.3)
LYMPHOCYTES # BLD AUTO: 29.1 % — SIGNIFICANT CHANGE UP (ref 13–44)
LYMPHOCYTES # BLD AUTO: 29.1 % — SIGNIFICANT CHANGE UP (ref 13–44)
MCHC RBC-ENTMCNC: 30.8 PG — SIGNIFICANT CHANGE UP (ref 27–34)
MCHC RBC-ENTMCNC: 30.8 PG — SIGNIFICANT CHANGE UP (ref 27–34)
MCHC RBC-ENTMCNC: 34.3 GM/DL — SIGNIFICANT CHANGE UP (ref 32–36)
MCHC RBC-ENTMCNC: 34.3 GM/DL — SIGNIFICANT CHANGE UP (ref 32–36)
MCV RBC AUTO: 89.6 FL — SIGNIFICANT CHANGE UP (ref 80–100)
MCV RBC AUTO: 89.6 FL — SIGNIFICANT CHANGE UP (ref 80–100)
MONOCYTES # BLD AUTO: 0.46 K/UL — SIGNIFICANT CHANGE UP (ref 0–0.9)
MONOCYTES # BLD AUTO: 0.46 K/UL — SIGNIFICANT CHANGE UP (ref 0–0.9)
MONOCYTES NFR BLD AUTO: 8.2 % — SIGNIFICANT CHANGE UP (ref 2–14)
MONOCYTES NFR BLD AUTO: 8.2 % — SIGNIFICANT CHANGE UP (ref 2–14)
MRSA PCR RESULT.: SIGNIFICANT CHANGE UP
NEUTROPHILS # BLD AUTO: 2.93 K/UL — SIGNIFICANT CHANGE UP (ref 1.8–7.4)
NEUTROPHILS # BLD AUTO: 2.93 K/UL — SIGNIFICANT CHANGE UP (ref 1.8–7.4)
NEUTROPHILS NFR BLD AUTO: 52.3 % — SIGNIFICANT CHANGE UP (ref 43–77)
NEUTROPHILS NFR BLD AUTO: 52.3 % — SIGNIFICANT CHANGE UP (ref 43–77)
PLATELET # BLD AUTO: 287 K/UL — SIGNIFICANT CHANGE UP (ref 150–400)
PLATELET # BLD AUTO: 287 K/UL — SIGNIFICANT CHANGE UP (ref 150–400)
POTASSIUM SERPL-MCNC: 4.8 MMOL/L — SIGNIFICANT CHANGE UP (ref 3.5–5.3)
POTASSIUM SERPL-SCNC: 4.8 MMOL/L — SIGNIFICANT CHANGE UP (ref 3.5–5.3)
PROT SERPL-MCNC: 7.7 G/DL — SIGNIFICANT CHANGE UP (ref 6.6–8.7)
PROTHROM AB SERPL-ACNC: 10.3 SEC — SIGNIFICANT CHANGE UP (ref 9.5–13)
PROTHROM AB SERPL-ACNC: 10.3 SEC — SIGNIFICANT CHANGE UP (ref 9.5–13)
RBC # BLD: 5.17 M/UL — SIGNIFICANT CHANGE UP (ref 4.2–5.8)
RBC # BLD: 5.17 M/UL — SIGNIFICANT CHANGE UP (ref 4.2–5.8)
RBC # FLD: 11.8 % — SIGNIFICANT CHANGE UP (ref 10.3–14.5)
RBC # FLD: 11.8 % — SIGNIFICANT CHANGE UP (ref 10.3–14.5)
S AUREUS DNA NOSE QL NAA+PROBE: SIGNIFICANT CHANGE UP
SODIUM SERPL-SCNC: 140 MMOL/L — SIGNIFICANT CHANGE UP (ref 135–145)
WBC # BLD: 5.6 K/UL — SIGNIFICANT CHANGE UP (ref 3.8–10.5)
WBC # BLD: 5.6 K/UL — SIGNIFICANT CHANGE UP (ref 3.8–10.5)
WBC # FLD AUTO: 5.6 K/UL — SIGNIFICANT CHANGE UP (ref 3.8–10.5)
WBC # FLD AUTO: 5.6 K/UL — SIGNIFICANT CHANGE UP (ref 3.8–10.5)

## 2023-10-12 PROCEDURE — 71046 X-RAY EXAM CHEST 2 VIEWS: CPT

## 2023-10-12 PROCEDURE — 71046 X-RAY EXAM CHEST 2 VIEWS: CPT | Mod: 26

## 2023-10-12 PROCEDURE — G0463: CPT

## 2023-10-12 RX ORDER — QUETIAPINE FUMARATE 200 MG/1
1 TABLET, FILM COATED ORAL
Refills: 0 | DISCHARGE

## 2023-10-12 RX ORDER — CLONAZEPAM 1 MG
1 TABLET ORAL
Refills: 0 | DISCHARGE

## 2023-10-12 RX ORDER — PRAZOSIN HCL 2 MG
1 CAPSULE ORAL
Refills: 0 | DISCHARGE

## 2023-10-12 RX ORDER — DEXTROAMPHETAMINE SACCHARATE, AMPHETAMINE ASPARTATE, DEXTROAMPHETAMINE SULFATE AND AMPHETAMINE SULFATE 1.875; 1.875; 1.875; 1.875 MG/1; MG/1; MG/1; MG/1
1 TABLET ORAL
Refills: 0 | DISCHARGE

## 2023-10-12 RX ORDER — FLUOXETINE HCL 10 MG
1 CAPSULE ORAL
Refills: 0 | DISCHARGE

## 2023-10-12 NOTE — H&P PST ADULT - NEGATIVE NEUROLOGICAL SYMPTOMS
no transient paralysis/no weakness/no paresthesias/no generalized seizures/no focal seizures/no syncope/no tremors/no vertigo/no loss of sensation/no difficulty walking/no hemiparesis/no confusion/no facial palsy
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
## 2023-10-12 NOTE — H&P PST ADULT - PROBLEM SELECTOR PLAN 1
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

## 2023-10-13 PROBLEM — E23.6 OTHER DISORDERS OF PITUITARY GLAND: Chronic | Status: ACTIVE | Noted: 2023-10-12

## 2023-10-13 PROBLEM — Q04.6 CONGENITAL CEREBRAL CYSTS: Chronic | Status: ACTIVE | Noted: 2023-10-12

## 2023-10-20 ENCOUNTER — APPOINTMENT (OUTPATIENT)
Dept: NEUROSURGERY | Facility: CLINIC | Age: 38
End: 2023-10-20
Payer: MEDICAID

## 2023-10-20 ENCOUNTER — NON-APPOINTMENT (OUTPATIENT)
Age: 38
End: 2023-10-20

## 2023-10-20 VITALS
OXYGEN SATURATION: 99 % | BODY MASS INDEX: 22.9 KG/M2 | WEIGHT: 160 LBS | HEIGHT: 70 IN | DIASTOLIC BLOOD PRESSURE: 85 MMHG | SYSTOLIC BLOOD PRESSURE: 126 MMHG | HEART RATE: 82 BPM

## 2023-10-20 PROCEDURE — 99215 OFFICE O/P EST HI 40 MIN: CPT

## 2023-11-01 ENCOUNTER — APPOINTMENT (OUTPATIENT)
Dept: NEUROSURGERY | Facility: HOSPITAL | Age: 38
End: 2023-11-01

## 2023-11-08 ENCOUNTER — OUTPATIENT (OUTPATIENT)
Dept: OUTPATIENT SERVICES | Facility: HOSPITAL | Age: 38
LOS: 1 days | End: 2023-11-08
Payer: MEDICAID

## 2023-11-08 VITALS
OXYGEN SATURATION: 99 % | HEART RATE: 69 BPM | TEMPERATURE: 98 F | WEIGHT: 166.89 LBS | DIASTOLIC BLOOD PRESSURE: 85 MMHG | HEIGHT: 70 IN | SYSTOLIC BLOOD PRESSURE: 122 MMHG | RESPIRATION RATE: 18 BRPM

## 2023-11-08 DIAGNOSIS — Q04.6 CONGENITAL CEREBRAL CYSTS: ICD-10-CM

## 2023-11-08 DIAGNOSIS — Z01.818 ENCOUNTER FOR OTHER PREPROCEDURAL EXAMINATION: ICD-10-CM

## 2023-11-08 DIAGNOSIS — E23.6 OTHER DISORDERS OF PITUITARY GLAND: ICD-10-CM

## 2023-11-08 DIAGNOSIS — E23.6 OTHER DISORDERS OF PITUITARY GLAND: Chronic | ICD-10-CM

## 2023-11-08 DIAGNOSIS — Z29.9 ENCOUNTER FOR PROPHYLACTIC MEASURES, UNSPECIFIED: ICD-10-CM

## 2023-11-08 LAB
ANION GAP SERPL CALC-SCNC: 15 MMOL/L — SIGNIFICANT CHANGE UP (ref 5–17)
ANION GAP SERPL CALC-SCNC: 15 MMOL/L — SIGNIFICANT CHANGE UP (ref 5–17)
BLD GP AB SCN SERPL QL: NEGATIVE — SIGNIFICANT CHANGE UP
BLD GP AB SCN SERPL QL: NEGATIVE — SIGNIFICANT CHANGE UP
BUN SERPL-MCNC: 26 MG/DL — HIGH (ref 7–23)
BUN SERPL-MCNC: 26 MG/DL — HIGH (ref 7–23)
CALCIUM SERPL-MCNC: 9.9 MG/DL — SIGNIFICANT CHANGE UP (ref 8.4–10.5)
CALCIUM SERPL-MCNC: 9.9 MG/DL — SIGNIFICANT CHANGE UP (ref 8.4–10.5)
CHLORIDE SERPL-SCNC: 101 MMOL/L — SIGNIFICANT CHANGE UP (ref 96–108)
CHLORIDE SERPL-SCNC: 101 MMOL/L — SIGNIFICANT CHANGE UP (ref 96–108)
CO2 SERPL-SCNC: 27 MMOL/L — SIGNIFICANT CHANGE UP (ref 22–31)
CO2 SERPL-SCNC: 27 MMOL/L — SIGNIFICANT CHANGE UP (ref 22–31)
CREAT SERPL-MCNC: 0.65 MG/DL — SIGNIFICANT CHANGE UP (ref 0.5–1.3)
CREAT SERPL-MCNC: 0.65 MG/DL — SIGNIFICANT CHANGE UP (ref 0.5–1.3)
EGFR: 124 ML/MIN/1.73M2 — SIGNIFICANT CHANGE UP
EGFR: 124 ML/MIN/1.73M2 — SIGNIFICANT CHANGE UP
GLUCOSE SERPL-MCNC: 73 MG/DL — SIGNIFICANT CHANGE UP (ref 70–99)
GLUCOSE SERPL-MCNC: 73 MG/DL — SIGNIFICANT CHANGE UP (ref 70–99)
HCT VFR BLD CALC: 46.8 % — SIGNIFICANT CHANGE UP (ref 39–50)
HCT VFR BLD CALC: 46.8 % — SIGNIFICANT CHANGE UP (ref 39–50)
HGB BLD-MCNC: 16.1 G/DL — SIGNIFICANT CHANGE UP (ref 13–17)
HGB BLD-MCNC: 16.1 G/DL — SIGNIFICANT CHANGE UP (ref 13–17)
MCHC RBC-ENTMCNC: 30.7 PG — SIGNIFICANT CHANGE UP (ref 27–34)
MCHC RBC-ENTMCNC: 30.7 PG — SIGNIFICANT CHANGE UP (ref 27–34)
MCHC RBC-ENTMCNC: 34.4 GM/DL — SIGNIFICANT CHANGE UP (ref 32–36)
MCHC RBC-ENTMCNC: 34.4 GM/DL — SIGNIFICANT CHANGE UP (ref 32–36)
MCV RBC AUTO: 89.1 FL — SIGNIFICANT CHANGE UP (ref 80–100)
MCV RBC AUTO: 89.1 FL — SIGNIFICANT CHANGE UP (ref 80–100)
MRSA PCR RESULT.: SIGNIFICANT CHANGE UP
MRSA PCR RESULT.: SIGNIFICANT CHANGE UP
NRBC # BLD: 0 /100 WBCS — SIGNIFICANT CHANGE UP (ref 0–0)
NRBC # BLD: 0 /100 WBCS — SIGNIFICANT CHANGE UP (ref 0–0)
PLATELET # BLD AUTO: 314 K/UL — SIGNIFICANT CHANGE UP (ref 150–400)
PLATELET # BLD AUTO: 314 K/UL — SIGNIFICANT CHANGE UP (ref 150–400)
POTASSIUM SERPL-MCNC: 4 MMOL/L — SIGNIFICANT CHANGE UP (ref 3.5–5.3)
POTASSIUM SERPL-MCNC: 4 MMOL/L — SIGNIFICANT CHANGE UP (ref 3.5–5.3)
POTASSIUM SERPL-SCNC: 4 MMOL/L — SIGNIFICANT CHANGE UP (ref 3.5–5.3)
POTASSIUM SERPL-SCNC: 4 MMOL/L — SIGNIFICANT CHANGE UP (ref 3.5–5.3)
RBC # BLD: 5.25 M/UL — SIGNIFICANT CHANGE UP (ref 4.2–5.8)
RBC # BLD: 5.25 M/UL — SIGNIFICANT CHANGE UP (ref 4.2–5.8)
RBC # FLD: 11.9 % — SIGNIFICANT CHANGE UP (ref 10.3–14.5)
RBC # FLD: 11.9 % — SIGNIFICANT CHANGE UP (ref 10.3–14.5)
RH IG SCN BLD-IMP: POSITIVE — SIGNIFICANT CHANGE UP
RH IG SCN BLD-IMP: POSITIVE — SIGNIFICANT CHANGE UP
S AUREUS DNA NOSE QL NAA+PROBE: SIGNIFICANT CHANGE UP
S AUREUS DNA NOSE QL NAA+PROBE: SIGNIFICANT CHANGE UP
SODIUM SERPL-SCNC: 143 MMOL/L — SIGNIFICANT CHANGE UP (ref 135–145)
SODIUM SERPL-SCNC: 143 MMOL/L — SIGNIFICANT CHANGE UP (ref 135–145)
WBC # BLD: 6.38 K/UL — SIGNIFICANT CHANGE UP (ref 3.8–10.5)
WBC # BLD: 6.38 K/UL — SIGNIFICANT CHANGE UP (ref 3.8–10.5)
WBC # FLD AUTO: 6.38 K/UL — SIGNIFICANT CHANGE UP (ref 3.8–10.5)
WBC # FLD AUTO: 6.38 K/UL — SIGNIFICANT CHANGE UP (ref 3.8–10.5)

## 2023-11-08 PROCEDURE — 85027 COMPLETE CBC AUTOMATED: CPT

## 2023-11-08 PROCEDURE — 87640 STAPH A DNA AMP PROBE: CPT

## 2023-11-08 PROCEDURE — 80048 BASIC METABOLIC PNL TOTAL CA: CPT

## 2023-11-08 PROCEDURE — 36415 COLL VENOUS BLD VENIPUNCTURE: CPT

## 2023-11-08 PROCEDURE — 86901 BLOOD TYPING SEROLOGIC RH(D): CPT

## 2023-11-08 PROCEDURE — 87641 MR-STAPH DNA AMP PROBE: CPT

## 2023-11-08 PROCEDURE — G0463: CPT

## 2023-11-08 PROCEDURE — 86850 RBC ANTIBODY SCREEN: CPT

## 2023-11-08 PROCEDURE — 86900 BLOOD TYPING SEROLOGIC ABO: CPT

## 2023-11-08 NOTE — H&P PST ADULT - PSYCHIATRIC COMMENTS
Patient tearful and expresses worry about repeat surgery. He states he sits at home all day and does limited activities over fear of fainting. Denies SI or wanting to harm himself. He reports having support at home. Patient tearful and expresses worry about repeat surgery. He states he sits at home all day and does limited activities over fear of fainting. Denies SI or wanting to harm himself or others. He reports having support at home. Patient instructed to seek medical care if his mental health becomes worse or if he develops any thoughts of wanting to harm himself. He has follow up with his PCP next week and verbalized he will discuss any issues with her.

## 2023-11-08 NOTE — H&P PST ADULT - PROBLEM SELECTOR PLAN 1
Right Inter Hemispheric Transcallosal Approach For Craniotomy of Colloid Cyst on 11/22/23.   -No medications DOS  -Cardiology and PCP clearances requested by sx team.

## 2023-11-08 NOTE — H&P PST ADULT - NSICDXPASTMEDICALHX_GEN_ALL_CORE_FT
PAST MEDICAL HISTORY:  Congenital cerebral cysts     Fall     Rathke's cyst     Syncopal episodes

## 2023-11-08 NOTE — H&P PST ADULT - NEGATIVE NEUROLOGICAL SYMPTOMS
no weakness/no paresthesias/no generalized seizures/no focal seizures/no loss of sensation/no difficulty walking/no headache

## 2023-11-08 NOTE — H&P PST ADULT - ASSESSMENT
DASI score: 7.99  DASI activity: Patient is independent, can walk and climb stairs. No limitations in activity normally, denies cardiopulmonary symptoms.   Loose teeth or denture: Denies dental work or loose/shaking teeth.  MP 2      CAPRINI VTE 2.0 SCORE [CLOT updated 2019]    AGE RELATED RISK FACTORS                                                       MOBILITY RELATED FACTORS  [ ] Age 41-60 years                                            (1 Point)                    [ ] Bed rest                                                        (1 Point)  [ ] Age: 61-74 years                                           (2 Points)                  [ ] Plaster cast                                                   (2 Points)  [ ] Age= 75 years                                              (3 Points)                    [ ] Bed bound for more than 72 hours                 (2 Points)    DISEASE RELATED RISK FACTORS                                               GENDER SPECIFIC FACTORS  [ ] Edema in the lower extremities                       (1 Point)              [ ] Pregnancy                                                     (1 Point)  [ ] Varicose veins                                               (1 Point)                     [ ] Post-partum < 6 weeks                                   (1 Point)             [ ] BMI > 25 Kg/m2                                            (1 Point)                     [ ] Hormonal therapy  or oral contraception          (1 Point)                 [ ] Sepsis (in the previous month)                        (1 Point)               [ ] History of pregnancy complications                 (1 point)  [ ] Pneumonia or serious lung disease                                               [ ] Unexplained or recurrent                     (1 Point)           (in the previous month)                               (1 Point)  [ ] Abnormal pulmonary function test                     (1 Point)                 SURGERY RELATED RISK FACTORS  [ ] Acute myocardial infarction                              (1 Point)               [ ]  Section                                             (1 Point)  [ ] Congestive heart failure (in the previous month)  (1 Point)      [ ] Minor surgery                                                  (1 Point)   [ ] Inflammatory bowel disease                             (1 Point)               [ ] Arthroscopic surgery                                        (2 Points)  [ ] Central venous access                                      (2 Points)                [X ] General surgery lasting more than 45 minutes (2 points)  [ ] Malignancy- Present or previous                   (2 Points)                [ ] Elective arthroplasty                                         (5 points)    [ ] Stroke (in the previous month)                          (5 Points)                                                                                                                                                           HEMATOLOGY RELATED FACTORS                                                 TRAUMA RELATED RISK FACTORS  [ ] Prior episodes of VTE                                     (3 Points)                [ ] Fracture of the hip, pelvis, or leg                       (5 Points)  [ ] Positive family history for VTE                         (3 Points)             [ ] Acute spinal cord injury (in the previous month)  (5 Points)  [ ] Prothrombin 35784 A                                     (3 Points)               [ ] Paralysis  (less than 1 month)                             (5 Points)  [ ] Factor V Leiden                                             (3 Points)                  [ ] Multiple Trauma within 1 month                        (5 Points)  [ ] Lupus anticoagulants                                     (3 Points)                                                           [ ] Anticardiolipin antibodies                               (3 Points)                                                       [ ] High homocysteine in the blood                      (3 Points)                                             [ ] Other congenital or acquired thrombophilia      (3 Points)                                                [ ] Heparin induced thrombocytopenia                  (3 Points)                                     Total Score [     2     ]

## 2023-11-08 NOTE — H&P PST ADULT - RESPIRATORY
clear to auscultation bilaterally/no wheezes/no rales/no rhonchi/no respiratory distress/no use of accessory muscles/airway patent/good air movement/respirations non-labored

## 2023-11-08 NOTE — H&P PST ADULT - HISTORY OF PRESENT ILLNESS
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 Chinchilla on 5/8/2023. Since then he has chief complaint of sudden dropping/fainting episodes. It is so severe it has impacted his quality of life poorly. These episodes are spontaneous, non provoked and can occur multiple times per day. He has a known third ventricle colloid cyst and consulted with Dr. Dominguez for resection. He presents to [facility] for evaluation prior to Right Inter Hemispheric Transcallosal Approach For Craniotomy of Colloid Cyst on 11/22/23.

## 2023-11-21 ENCOUNTER — APPOINTMENT (OUTPATIENT)
Dept: GASTROENTEROLOGY | Facility: CLINIC | Age: 38
End: 2023-11-21

## 2023-11-22 ENCOUNTER — APPOINTMENT (OUTPATIENT)
Dept: NEUROSURGERY | Facility: HOSPITAL | Age: 38
End: 2023-11-22

## 2023-12-11 PROBLEM — R55 SYNCOPE AND COLLAPSE: Chronic | Status: ACTIVE | Noted: 2023-11-08
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
## 2023-12-12 ENCOUNTER — OUTPATIENT (OUTPATIENT)
Dept: OUTPATIENT SERVICES | Facility: HOSPITAL | Age: 38
LOS: 1 days | End: 2023-12-12
Payer: MEDICAID

## 2023-12-12 VITALS
RESPIRATION RATE: 12 BRPM | WEIGHT: 169.98 LBS | TEMPERATURE: 98 F | SYSTOLIC BLOOD PRESSURE: 134 MMHG | HEART RATE: 79 BPM | HEIGHT: 70 IN | OXYGEN SATURATION: 100 % | DIASTOLIC BLOOD PRESSURE: 84 MMHG

## 2023-12-12 DIAGNOSIS — Q04.6 CONGENITAL CEREBRAL CYSTS: ICD-10-CM

## 2023-12-12 DIAGNOSIS — Z01.818 ENCOUNTER FOR OTHER PREPROCEDURAL EXAMINATION: ICD-10-CM

## 2023-12-12 DIAGNOSIS — E23.6 OTHER DISORDERS OF PITUITARY GLAND: Chronic | ICD-10-CM

## 2023-12-12 DIAGNOSIS — Z29.9 ENCOUNTER FOR PROPHYLACTIC MEASURES, UNSPECIFIED: ICD-10-CM

## 2023-12-12 LAB
ANION GAP SERPL CALC-SCNC: 14 MMOL/L — SIGNIFICANT CHANGE UP (ref 5–17)
ANION GAP SERPL CALC-SCNC: 14 MMOL/L — SIGNIFICANT CHANGE UP (ref 5–17)
BLD GP AB SCN SERPL QL: NEGATIVE — SIGNIFICANT CHANGE UP
BLD GP AB SCN SERPL QL: NEGATIVE — SIGNIFICANT CHANGE UP
BUN SERPL-MCNC: 22 MG/DL — SIGNIFICANT CHANGE UP (ref 7–23)
BUN SERPL-MCNC: 22 MG/DL — SIGNIFICANT CHANGE UP (ref 7–23)
CALCIUM SERPL-MCNC: 10.1 MG/DL — SIGNIFICANT CHANGE UP (ref 8.4–10.5)
CALCIUM SERPL-MCNC: 10.1 MG/DL — SIGNIFICANT CHANGE UP (ref 8.4–10.5)
CHLORIDE SERPL-SCNC: 94 MMOL/L — LOW (ref 96–108)
CHLORIDE SERPL-SCNC: 94 MMOL/L — LOW (ref 96–108)
CO2 SERPL-SCNC: 28 MMOL/L — SIGNIFICANT CHANGE UP (ref 22–31)
CO2 SERPL-SCNC: 28 MMOL/L — SIGNIFICANT CHANGE UP (ref 22–31)
CREAT SERPL-MCNC: 0.82 MG/DL — SIGNIFICANT CHANGE UP (ref 0.5–1.3)
CREAT SERPL-MCNC: 0.82 MG/DL — SIGNIFICANT CHANGE UP (ref 0.5–1.3)
EGFR: 115 ML/MIN/1.73M2 — SIGNIFICANT CHANGE UP
EGFR: 115 ML/MIN/1.73M2 — SIGNIFICANT CHANGE UP
GLUCOSE SERPL-MCNC: 72 MG/DL — SIGNIFICANT CHANGE UP (ref 70–99)
GLUCOSE SERPL-MCNC: 72 MG/DL — SIGNIFICANT CHANGE UP (ref 70–99)
HCT VFR BLD CALC: 49.5 % — SIGNIFICANT CHANGE UP (ref 39–50)
HCT VFR BLD CALC: 49.5 % — SIGNIFICANT CHANGE UP (ref 39–50)
HGB BLD-MCNC: 16.9 G/DL — SIGNIFICANT CHANGE UP (ref 13–17)
HGB BLD-MCNC: 16.9 G/DL — SIGNIFICANT CHANGE UP (ref 13–17)
MCHC RBC-ENTMCNC: 30.6 PG — SIGNIFICANT CHANGE UP (ref 27–34)
MCHC RBC-ENTMCNC: 30.6 PG — SIGNIFICANT CHANGE UP (ref 27–34)
MCHC RBC-ENTMCNC: 34.1 GM/DL — SIGNIFICANT CHANGE UP (ref 32–36)
MCHC RBC-ENTMCNC: 34.1 GM/DL — SIGNIFICANT CHANGE UP (ref 32–36)
MCV RBC AUTO: 89.5 FL — SIGNIFICANT CHANGE UP (ref 80–100)
MCV RBC AUTO: 89.5 FL — SIGNIFICANT CHANGE UP (ref 80–100)
NRBC # BLD: 0 /100 WBCS — SIGNIFICANT CHANGE UP (ref 0–0)
NRBC # BLD: 0 /100 WBCS — SIGNIFICANT CHANGE UP (ref 0–0)
PLATELET # BLD AUTO: 301 K/UL — SIGNIFICANT CHANGE UP (ref 150–400)
PLATELET # BLD AUTO: 301 K/UL — SIGNIFICANT CHANGE UP (ref 150–400)
POTASSIUM SERPL-MCNC: 3.6 MMOL/L — SIGNIFICANT CHANGE UP (ref 3.5–5.3)
POTASSIUM SERPL-MCNC: 3.6 MMOL/L — SIGNIFICANT CHANGE UP (ref 3.5–5.3)
POTASSIUM SERPL-SCNC: 3.6 MMOL/L — SIGNIFICANT CHANGE UP (ref 3.5–5.3)
POTASSIUM SERPL-SCNC: 3.6 MMOL/L — SIGNIFICANT CHANGE UP (ref 3.5–5.3)
RBC # BLD: 5.53 M/UL — SIGNIFICANT CHANGE UP (ref 4.2–5.8)
RBC # BLD: 5.53 M/UL — SIGNIFICANT CHANGE UP (ref 4.2–5.8)
RBC # FLD: 12.1 % — SIGNIFICANT CHANGE UP (ref 10.3–14.5)
RBC # FLD: 12.1 % — SIGNIFICANT CHANGE UP (ref 10.3–14.5)
RH IG SCN BLD-IMP: POSITIVE — SIGNIFICANT CHANGE UP
RH IG SCN BLD-IMP: POSITIVE — SIGNIFICANT CHANGE UP
SODIUM SERPL-SCNC: 136 MMOL/L — SIGNIFICANT CHANGE UP (ref 135–145)
SODIUM SERPL-SCNC: 136 MMOL/L — SIGNIFICANT CHANGE UP (ref 135–145)
WBC # BLD: 6.88 K/UL — SIGNIFICANT CHANGE UP (ref 3.8–10.5)
WBC # BLD: 6.88 K/UL — SIGNIFICANT CHANGE UP (ref 3.8–10.5)
WBC # FLD AUTO: 6.88 K/UL — SIGNIFICANT CHANGE UP (ref 3.8–10.5)
WBC # FLD AUTO: 6.88 K/UL — SIGNIFICANT CHANGE UP (ref 3.8–10.5)

## 2023-12-12 PROCEDURE — 86901 BLOOD TYPING SEROLOGIC RH(D): CPT

## 2023-12-12 PROCEDURE — G0463: CPT

## 2023-12-12 PROCEDURE — 80048 BASIC METABOLIC PNL TOTAL CA: CPT

## 2023-12-12 PROCEDURE — 87641 MR-STAPH DNA AMP PROBE: CPT

## 2023-12-12 PROCEDURE — 86850 RBC ANTIBODY SCREEN: CPT

## 2023-12-12 PROCEDURE — 85027 COMPLETE CBC AUTOMATED: CPT

## 2023-12-12 PROCEDURE — 86900 BLOOD TYPING SEROLOGIC ABO: CPT

## 2023-12-12 PROCEDURE — 87640 STAPH A DNA AMP PROBE: CPT

## 2023-12-12 NOTE — H&P PST ADULT - HISTORY OF PRESENT ILLNESS
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 Chinchilla on 5/8/2023. Since then he has chief complaint of sudden dropping/fainting episodes. It is so severe it has impacted his quality of life poorly. These episodes are spontaneous, non provoked and can occur multiple times per day. He has a known third ventricle colloid cyst and consulted with Dr. Dominguez for resection. He presents to [facility] for evaluation prior to Right Inter Hemispheric Transcallosal Approach For Craniotomy of Colloid Cyst on12/20/23. Patient denies recent fever, chills, chest pain, SOB, palpitations, or recent exposure to COVID-19.

## 2023-12-12 NOTE — H&P PST ADULT - PROBLEM SELECTOR PLAN 1
Right Inter Hemispheric Transcallosal Approach For Craniotomy of Colloid Cyst on12/20/23  CBC, BMP, Type&screen, and MRSA done today at [facility].   Pre op instructions, including chlorhexidine, provided and all questions answered. Right Inter Hemispheric Transcallosal Approach For Craniotomy of Colloid Cyst on12/20/23  CBC, BMP, Type&screen, and MRSA done today at [facility].   Pre op instructions, including chlorhexidine, provided and all questions answered.

## 2023-12-12 NOTE — H&P PST ADULT - PRIMARY CARE PROVIDER
Dr. Kitty Lopez [phone]- will make appointment for clearance Dr. Kitty Lopez [phone]- will make appointment for clearance

## 2023-12-12 NOTE — H&P PST ADULT - ASSESSMENT
Activity: Patient states that he does not do much activity lately, but that he can walk for 20 minutes without stopping and climb 2-3 flights of stairs if necessary without symptoms.     DASI: 7.59     Mallampati: 3    Dental: Denies loose teeth or dentures.     CORDELIAI VTE 2.0 SCORE [CLOT updated 2019]    AGE RELATED RISK FACTORS                                                       MOBILITY RELATED FACTORS  [ ] Age 41-60 years                                            (1 Point)                    [ ] Bed rest                                                        (1 Point)  [ ] Age: 61-74 years                                           (2 Points)                  [ ] Plaster cast                                                   (2 Points)  [ ] Age= 75 years                                              (3 Points)                    [ ] Bed bound for more than 72 hours                 (2 Points)    DISEASE RELATED RISK FACTORS                                               GENDER SPECIFIC FACTORS  [ ] Edema in the lower extremities                       (1 Point)              [ ] Pregnancy                                                     (1 Point)  [ ] Varicose veins                                               (1 Point)                     [ ] Post-partum < 6 weeks                                   (1 Point)             [ ] BMI > 25 Kg/m2                                            (1 Point)                     [ ] Hormonal therapy  or oral contraception          (1 Point)                 [ ] Sepsis (in the previous month)                        (1 Point)               [ ] History of pregnancy complications                 (1 point)  [ ] Pneumonia or serious lung disease                                               [ ] Unexplained or recurrent                     (1 Point)           (in the previous month)                               (1 Point)  [ ] Abnormal pulmonary function test                     (1 Point)                 SURGERY RELATED RISK FACTORS  [ ] Acute myocardial infarction                              (1 Point)               [ ]  Section                                             (1 Point)  [ ] Congestive heart failure (in the previous month)  (1 Point)      [ ] Minor surgery                                                  (1 Point)   [ ] Inflammatory bowel disease                             (1 Point)               [ ] Arthroscopic surgery                                        (2 Points)  [ ] Central venous access                                      (2 Points)                [X ] General surgery lasting more than 45 minutes (2 points)  [ ] Malignancy- Present or previous                   (2 Points)                [ ] Elective arthroplasty                                         (5 points)    [ ] Stroke (in the previous month)                          (5 Points)                                                                                                                                                           HEMATOLOGY RELATED FACTORS                                                 TRAUMA RELATED RISK FACTORS  [ ] Prior episodes of VTE                                     (3 Points)                [ ] Fracture of the hip, pelvis, or leg                       (5 Points)  [ ] Positive family history for VTE                         (3 Points)             [ ] Acute spinal cord injury (in the previous month)  (5 Points)  [ ] Prothrombin 36917 A                                     (3 Points)               [ ] Paralysis  (less than 1 month)                             (5 Points)  [ ] Factor V Leiden                                             (3 Points)                  [ ] Multiple Trauma within 1 month                        (5 Points)  [ ] Lupus anticoagulants                                     (3 Points)                                                           [ ] Anticardiolipin antibodies                               (3 Points)                                                       [ ] High homocysteine in the blood                      (3 Points)                                             [ ] Other congenital or acquired thrombophilia      (3 Points)                                                [ ] Heparin induced thrombocytopenia                  (3 Points)                                     Total Score [     2     ] Activity: Patient states that he does not do much activity lately, but that he can walk for 20 minutes without stopping and climb 2-3 flights of stairs if necessary without symptoms.     DASI: 7.59     Mallampati: 3    Dental: Denies loose teeth or dentures.     CORDELIAI VTE 2.0 SCORE [CLOT updated 2019]    AGE RELATED RISK FACTORS                                                       MOBILITY RELATED FACTORS  [ ] Age 41-60 years                                            (1 Point)                    [ ] Bed rest                                                        (1 Point)  [ ] Age: 61-74 years                                           (2 Points)                  [ ] Plaster cast                                                   (2 Points)  [ ] Age= 75 years                                              (3 Points)                    [ ] Bed bound for more than 72 hours                 (2 Points)    DISEASE RELATED RISK FACTORS                                               GENDER SPECIFIC FACTORS  [ ] Edema in the lower extremities                       (1 Point)              [ ] Pregnancy                                                     (1 Point)  [ ] Varicose veins                                               (1 Point)                     [ ] Post-partum < 6 weeks                                   (1 Point)             [ ] BMI > 25 Kg/m2                                            (1 Point)                     [ ] Hormonal therapy  or oral contraception          (1 Point)                 [ ] Sepsis (in the previous month)                        (1 Point)               [ ] History of pregnancy complications                 (1 point)  [ ] Pneumonia or serious lung disease                                               [ ] Unexplained or recurrent                     (1 Point)           (in the previous month)                               (1 Point)  [ ] Abnormal pulmonary function test                     (1 Point)                 SURGERY RELATED RISK FACTORS  [ ] Acute myocardial infarction                              (1 Point)               [ ]  Section                                             (1 Point)  [ ] Congestive heart failure (in the previous month)  (1 Point)      [ ] Minor surgery                                                  (1 Point)   [ ] Inflammatory bowel disease                             (1 Point)               [ ] Arthroscopic surgery                                        (2 Points)  [ ] Central venous access                                      (2 Points)                [X ] General surgery lasting more than 45 minutes (2 points)  [ ] Malignancy- Present or previous                   (2 Points)                [ ] Elective arthroplasty                                         (5 points)    [ ] Stroke (in the previous month)                          (5 Points)                                                                                                                                                           HEMATOLOGY RELATED FACTORS                                                 TRAUMA RELATED RISK FACTORS  [ ] Prior episodes of VTE                                     (3 Points)                [ ] Fracture of the hip, pelvis, or leg                       (5 Points)  [ ] Positive family history for VTE                         (3 Points)             [ ] Acute spinal cord injury (in the previous month)  (5 Points)  [ ] Prothrombin 47971 A                                     (3 Points)               [ ] Paralysis  (less than 1 month)                             (5 Points)  [ ] Factor V Leiden                                             (3 Points)                  [ ] Multiple Trauma within 1 month                        (5 Points)  [ ] Lupus anticoagulants                                     (3 Points)                                                           [ ] Anticardiolipin antibodies                               (3 Points)                                                       [ ] High homocysteine in the blood                      (3 Points)                                             [ ] Other congenital or acquired thrombophilia      (3 Points)                                                [ ] Heparin induced thrombocytopenia                  (3 Points)                                     Total Score [     2     ]

## 2023-12-12 NOTE — H&P PST ADULT - PSYCHIATRIC COMMENTS
Patient tearful and expresses worry about repeat surgery. He states he sits at home all day and does limited activities over fear of fainting. Denies SI or wanting to harm himself or others. He reports having support at home. Patient instructed to seek medical care if his mental health becomes worse or if he develops any thoughts of wanting to harm himself. He has follow up with his PCP next week and verbalized he will discuss any issues with her.

## 2023-12-13 LAB
MRSA PCR RESULT.: SIGNIFICANT CHANGE UP
MRSA PCR RESULT.: SIGNIFICANT CHANGE UP
S AUREUS DNA NOSE QL NAA+PROBE: SIGNIFICANT CHANGE UP
S AUREUS DNA NOSE QL NAA+PROBE: SIGNIFICANT CHANGE UP

## 2023-12-19 ENCOUNTER — TRANSCRIPTION ENCOUNTER (OUTPATIENT)
Age: 38
End: 2023-12-19

## 2023-12-20 ENCOUNTER — RESULT REVIEW (OUTPATIENT)
Age: 38
End: 2023-12-20

## 2023-12-20 ENCOUNTER — APPOINTMENT (OUTPATIENT)
Dept: NEUROSURGERY | Facility: HOSPITAL | Age: 38
End: 2023-12-20
Payer: MEDICAID

## 2023-12-20 ENCOUNTER — TRANSCRIPTION ENCOUNTER (OUTPATIENT)
Age: 38
End: 2023-12-20

## 2023-12-20 ENCOUNTER — INPATIENT (INPATIENT)
Facility: HOSPITAL | Age: 38
LOS: 1 days | Discharge: HOME | DRG: 25 | End: 2023-12-22
Attending: NEUROLOGICAL SURGERY | Admitting: NEUROLOGICAL SURGERY
Payer: MEDICAID

## 2023-12-20 VITALS
OXYGEN SATURATION: 99 % | DIASTOLIC BLOOD PRESSURE: 79 MMHG | HEIGHT: 70 IN | WEIGHT: 169.98 LBS | HEART RATE: 80 BPM | SYSTOLIC BLOOD PRESSURE: 127 MMHG | TEMPERATURE: 98 F | RESPIRATION RATE: 15 BRPM

## 2023-12-20 DIAGNOSIS — Q04.6 CONGENITAL CEREBRAL CYSTS: ICD-10-CM

## 2023-12-20 DIAGNOSIS — E23.6 OTHER DISORDERS OF PITUITARY GLAND: Chronic | ICD-10-CM

## 2023-12-20 LAB
ANION GAP SERPL CALC-SCNC: 15 MMOL/L — SIGNIFICANT CHANGE UP (ref 5–17)
ANION GAP SERPL CALC-SCNC: 15 MMOL/L — SIGNIFICANT CHANGE UP (ref 5–17)
APTT BLD: 30.5 SEC — SIGNIFICANT CHANGE UP (ref 24.5–35.6)
APTT BLD: 30.5 SEC — SIGNIFICANT CHANGE UP (ref 24.5–35.6)
APTT BLD: 32.4 SEC — SIGNIFICANT CHANGE UP (ref 24.5–35.6)
APTT BLD: 32.4 SEC — SIGNIFICANT CHANGE UP (ref 24.5–35.6)
APTT BLD: 57.6 SEC — HIGH (ref 24.5–35.6)
APTT BLD: 57.6 SEC — HIGH (ref 24.5–35.6)
BUN SERPL-MCNC: 15 MG/DL — SIGNIFICANT CHANGE UP (ref 7–23)
BUN SERPL-MCNC: 15 MG/DL — SIGNIFICANT CHANGE UP (ref 7–23)
CALCIUM SERPL-MCNC: 9.2 MG/DL — SIGNIFICANT CHANGE UP (ref 8.4–10.5)
CALCIUM SERPL-MCNC: 9.2 MG/DL — SIGNIFICANT CHANGE UP (ref 8.4–10.5)
CHLORIDE SERPL-SCNC: 104 MMOL/L — SIGNIFICANT CHANGE UP (ref 96–108)
CHLORIDE SERPL-SCNC: 104 MMOL/L — SIGNIFICANT CHANGE UP (ref 96–108)
CO2 SERPL-SCNC: 26 MMOL/L — SIGNIFICANT CHANGE UP (ref 22–31)
CO2 SERPL-SCNC: 26 MMOL/L — SIGNIFICANT CHANGE UP (ref 22–31)
CREAT SERPL-MCNC: 1.05 MG/DL — SIGNIFICANT CHANGE UP (ref 0.5–1.3)
CREAT SERPL-MCNC: 1.05 MG/DL — SIGNIFICANT CHANGE UP (ref 0.5–1.3)
EGFR: 93 ML/MIN/1.73M2 — SIGNIFICANT CHANGE UP
EGFR: 93 ML/MIN/1.73M2 — SIGNIFICANT CHANGE UP
GAS PNL BLDA: SIGNIFICANT CHANGE UP
GLUCOSE SERPL-MCNC: 147 MG/DL — HIGH (ref 70–99)
GLUCOSE SERPL-MCNC: 147 MG/DL — HIGH (ref 70–99)
HCT VFR BLD CALC: 40.6 % — SIGNIFICANT CHANGE UP (ref 39–50)
HCT VFR BLD CALC: 40.6 % — SIGNIFICANT CHANGE UP (ref 39–50)
HGB BLD-MCNC: 14.3 G/DL — SIGNIFICANT CHANGE UP (ref 13–17)
HGB BLD-MCNC: 14.3 G/DL — SIGNIFICANT CHANGE UP (ref 13–17)
INR BLD: 1.12 RATIO — SIGNIFICANT CHANGE UP (ref 0.85–1.18)
INR BLD: 1.12 RATIO — SIGNIFICANT CHANGE UP (ref 0.85–1.18)
INR BLD: 1.21 RATIO — HIGH (ref 0.85–1.18)
INR BLD: 1.21 RATIO — HIGH (ref 0.85–1.18)
INR BLD: 1.27 RATIO — HIGH (ref 0.85–1.18)
INR BLD: 1.27 RATIO — HIGH (ref 0.85–1.18)
MAGNESIUM SERPL-MCNC: 2.5 MG/DL — SIGNIFICANT CHANGE UP (ref 1.6–2.6)
MAGNESIUM SERPL-MCNC: 2.5 MG/DL — SIGNIFICANT CHANGE UP (ref 1.6–2.6)
MCHC RBC-ENTMCNC: 30.8 PG — SIGNIFICANT CHANGE UP (ref 27–34)
MCHC RBC-ENTMCNC: 30.8 PG — SIGNIFICANT CHANGE UP (ref 27–34)
MCHC RBC-ENTMCNC: 35.2 GM/DL — SIGNIFICANT CHANGE UP (ref 32–36)
MCHC RBC-ENTMCNC: 35.2 GM/DL — SIGNIFICANT CHANGE UP (ref 32–36)
MCV RBC AUTO: 87.3 FL — SIGNIFICANT CHANGE UP (ref 80–100)
MCV RBC AUTO: 87.3 FL — SIGNIFICANT CHANGE UP (ref 80–100)
NRBC # BLD: 0 /100 WBCS — SIGNIFICANT CHANGE UP (ref 0–0)
NRBC # BLD: 0 /100 WBCS — SIGNIFICANT CHANGE UP (ref 0–0)
PHOSPHATE SERPL-MCNC: 3.9 MG/DL — SIGNIFICANT CHANGE UP (ref 2.5–4.5)
PHOSPHATE SERPL-MCNC: 3.9 MG/DL — SIGNIFICANT CHANGE UP (ref 2.5–4.5)
PLATELET # BLD AUTO: 288 K/UL — SIGNIFICANT CHANGE UP (ref 150–400)
PLATELET # BLD AUTO: 288 K/UL — SIGNIFICANT CHANGE UP (ref 150–400)
POTASSIUM SERPL-MCNC: 4.4 MMOL/L — SIGNIFICANT CHANGE UP (ref 3.5–5.3)
POTASSIUM SERPL-MCNC: 4.4 MMOL/L — SIGNIFICANT CHANGE UP (ref 3.5–5.3)
POTASSIUM SERPL-SCNC: 4.4 MMOL/L — SIGNIFICANT CHANGE UP (ref 3.5–5.3)
POTASSIUM SERPL-SCNC: 4.4 MMOL/L — SIGNIFICANT CHANGE UP (ref 3.5–5.3)
PROTHROM AB SERPL-ACNC: 11.7 SEC — SIGNIFICANT CHANGE UP (ref 9.5–13)
PROTHROM AB SERPL-ACNC: 11.7 SEC — SIGNIFICANT CHANGE UP (ref 9.5–13)
PROTHROM AB SERPL-ACNC: 12.6 SEC — SIGNIFICANT CHANGE UP (ref 9.5–13)
PROTHROM AB SERPL-ACNC: 12.6 SEC — SIGNIFICANT CHANGE UP (ref 9.5–13)
PROTHROM AB SERPL-ACNC: 13.2 SEC — HIGH (ref 9.5–13)
PROTHROM AB SERPL-ACNC: 13.2 SEC — HIGH (ref 9.5–13)
RBC # BLD: 4.65 M/UL — SIGNIFICANT CHANGE UP (ref 4.2–5.8)
RBC # BLD: 4.65 M/UL — SIGNIFICANT CHANGE UP (ref 4.2–5.8)
RBC # FLD: 11.9 % — SIGNIFICANT CHANGE UP (ref 10.3–14.5)
RBC # FLD: 11.9 % — SIGNIFICANT CHANGE UP (ref 10.3–14.5)
SODIUM SERPL-SCNC: 145 MMOL/L — SIGNIFICANT CHANGE UP (ref 135–145)
SODIUM SERPL-SCNC: 145 MMOL/L — SIGNIFICANT CHANGE UP (ref 135–145)
WBC # BLD: 7.42 K/UL — SIGNIFICANT CHANGE UP (ref 3.8–10.5)
WBC # BLD: 7.42 K/UL — SIGNIFICANT CHANGE UP (ref 3.8–10.5)
WBC # FLD AUTO: 7.42 K/UL — SIGNIFICANT CHANGE UP (ref 3.8–10.5)
WBC # FLD AUTO: 7.42 K/UL — SIGNIFICANT CHANGE UP (ref 3.8–10.5)

## 2023-12-20 PROCEDURE — 95720 EEG PHY/QHP EA INCR W/VEEG: CPT

## 2023-12-20 PROCEDURE — 649993: CUSTOM

## 2023-12-20 PROCEDURE — 88307 TISSUE EXAM BY PATHOLOGIST: CPT | Mod: 26

## 2023-12-20 PROCEDURE — 99291 CRITICAL CARE FIRST HOUR: CPT

## 2023-12-20 DEVICE — TACHOSIL 9.5 X 4.8CM: Type: IMPLANTABLE DEVICE | Site: RIGHT | Status: FUNCTIONAL

## 2023-12-20 DEVICE — KIT A-LINE 1LUM 20G X 12CM SAFE KIT: Type: IMPLANTABLE DEVICE | Site: RIGHT | Status: FUNCTIONAL

## 2023-12-20 DEVICE — COVER BURR HOLE PLATE UN3 SHUNT W/ TAB 14MM: Type: IMPLANTABLE DEVICE | Site: RIGHT | Status: FUNCTIONAL

## 2023-12-20 DEVICE — PLATE UN3 2 HOLE: Type: IMPLANTABLE DEVICE | Site: RIGHT | Status: FUNCTIONAL

## 2023-12-20 DEVICE — SURGIFLO MATRIX WITH THROMBIN KIT: Type: IMPLANTABLE DEVICE | Site: RIGHT | Status: FUNCTIONAL

## 2023-12-20 DEVICE — PLATE COVER BURRHOLE UN3 W/TAB 14MM: Type: IMPLANTABLE DEVICE | Site: RIGHT | Status: FUNCTIONAL

## 2023-12-20 DEVICE — SURGIFOAM PAD 8CM X 12.5CM X 10MM (100): Type: IMPLANTABLE DEVICE | Site: RIGHT | Status: FUNCTIONAL

## 2023-12-20 DEVICE — SCREW UN3 AXS SELF DRILL 1.5X4MM: Type: IMPLANTABLE DEVICE | Site: RIGHT | Status: FUNCTIONAL

## 2023-12-20 RX ORDER — INFLUENZA VIRUS VACCINE 15; 15; 15; 15 UG/.5ML; UG/.5ML; UG/.5ML; UG/.5ML
0.5 SUSPENSION INTRAMUSCULAR ONCE
Refills: 0 | Status: COMPLETED | OUTPATIENT
Start: 2023-12-20 | End: 2023-12-20

## 2023-12-20 RX ORDER — LEVETIRACETAM 250 MG/1
500 TABLET, FILM COATED ORAL
Refills: 0 | Status: DISCONTINUED | OUTPATIENT
Start: 2023-12-20 | End: 2023-12-20

## 2023-12-20 RX ORDER — DEXAMETHASONE 0.5 MG/5ML
4 ELIXIR ORAL EVERY 6 HOURS
Refills: 0 | Status: DISCONTINUED | OUTPATIENT
Start: 2023-12-20 | End: 2023-12-20

## 2023-12-20 RX ORDER — CHLORHEXIDINE GLUCONATE 213 G/1000ML
1 SOLUTION TOPICAL ONCE
Refills: 0 | Status: DISCONTINUED | OUTPATIENT
Start: 2023-12-20 | End: 2023-12-20

## 2023-12-20 RX ORDER — CEFAZOLIN SODIUM 1 G
2000 VIAL (EA) INJECTION ONCE
Refills: 0 | Status: COMPLETED | OUTPATIENT
Start: 2023-12-20 | End: 2023-12-20

## 2023-12-20 RX ORDER — ACETAMINOPHEN 500 MG
1000 TABLET ORAL ONCE
Refills: 0 | Status: COMPLETED | OUTPATIENT
Start: 2023-12-20 | End: 2023-12-20

## 2023-12-20 RX ORDER — PANTOPRAZOLE SODIUM 20 MG/1
40 TABLET, DELAYED RELEASE ORAL
Refills: 0 | Status: DISCONTINUED | OUTPATIENT
Start: 2023-12-20 | End: 2023-12-22

## 2023-12-20 RX ORDER — OXYCODONE HYDROCHLORIDE 5 MG/1
5 TABLET ORAL EVERY 4 HOURS
Refills: 0 | Status: DISCONTINUED | OUTPATIENT
Start: 2023-12-20 | End: 2023-12-22

## 2023-12-20 RX ORDER — SENNA PLUS 8.6 MG/1
1 TABLET ORAL AT BEDTIME
Refills: 0 | Status: DISCONTINUED | OUTPATIENT
Start: 2023-12-20 | End: 2023-12-22

## 2023-12-20 RX ORDER — LEVETIRACETAM 250 MG/1
500 TABLET, FILM COATED ORAL
Refills: 0 | Status: DISCONTINUED | OUTPATIENT
Start: 2023-12-20 | End: 2023-12-22

## 2023-12-20 RX ORDER — POLYETHYLENE GLYCOL 3350 17 G/17G
17 POWDER, FOR SOLUTION ORAL DAILY
Refills: 0 | Status: DISCONTINUED | OUTPATIENT
Start: 2023-12-20 | End: 2023-12-22

## 2023-12-20 RX ORDER — DEXAMETHASONE 0.5 MG/5ML
4 ELIXIR ORAL EVERY 6 HOURS
Refills: 0 | Status: DISCONTINUED | OUTPATIENT
Start: 2023-12-20 | End: 2023-12-22

## 2023-12-20 RX ORDER — CEFAZOLIN SODIUM 1 G
2000 VIAL (EA) INJECTION EVERY 8 HOURS
Refills: 0 | Status: COMPLETED | OUTPATIENT
Start: 2023-12-20 | End: 2023-12-21

## 2023-12-20 RX ORDER — ARIPIPRAZOLE 15 MG/1
1 TABLET ORAL
Refills: 0 | DISCHARGE

## 2023-12-20 RX ORDER — SODIUM CHLORIDE 9 MG/ML
3 INJECTION INTRAMUSCULAR; INTRAVENOUS; SUBCUTANEOUS EVERY 8 HOURS
Refills: 0 | Status: DISCONTINUED | OUTPATIENT
Start: 2023-12-20 | End: 2023-12-20

## 2023-12-20 RX ORDER — LEVETIRACETAM 250 MG/1
1000 TABLET, FILM COATED ORAL
Refills: 0 | Status: DISCONTINUED | OUTPATIENT
Start: 2023-12-20 | End: 2023-12-20

## 2023-12-20 RX ORDER — OXYCODONE HYDROCHLORIDE 5 MG/1
10 TABLET ORAL EVERY 4 HOURS
Refills: 0 | Status: DISCONTINUED | OUTPATIENT
Start: 2023-12-20 | End: 2023-12-22

## 2023-12-20 RX ORDER — HYDROMORPHONE HYDROCHLORIDE 2 MG/ML
0.5 INJECTION INTRAMUSCULAR; INTRAVENOUS; SUBCUTANEOUS
Refills: 0 | Status: DISCONTINUED | OUTPATIENT
Start: 2023-12-20 | End: 2023-12-20

## 2023-12-20 RX ORDER — HYDROMORPHONE HYDROCHLORIDE 2 MG/ML
1 INJECTION INTRAMUSCULAR; INTRAVENOUS; SUBCUTANEOUS
Refills: 0 | Status: DISCONTINUED | OUTPATIENT
Start: 2023-12-20 | End: 2023-12-20

## 2023-12-20 RX ORDER — HYDROMORPHONE HYDROCHLORIDE 2 MG/ML
0.5 INJECTION INTRAMUSCULAR; INTRAVENOUS; SUBCUTANEOUS ONCE
Refills: 0 | Status: DISCONTINUED | OUTPATIENT
Start: 2023-12-20 | End: 2023-12-20

## 2023-12-20 RX ORDER — SODIUM CHLORIDE 9 MG/ML
1000 INJECTION INTRAMUSCULAR; INTRAVENOUS; SUBCUTANEOUS
Refills: 0 | Status: DISCONTINUED | OUTPATIENT
Start: 2023-12-20 | End: 2023-12-20

## 2023-12-20 RX ORDER — LIDOCAINE HCL 20 MG/ML
0.2 VIAL (ML) INJECTION ONCE
Refills: 0 | Status: DISCONTINUED | OUTPATIENT
Start: 2023-12-20 | End: 2023-12-20

## 2023-12-20 RX ORDER — ACETAMINOPHEN 500 MG
650 TABLET ORAL EVERY 6 HOURS
Refills: 0 | Status: DISCONTINUED | OUTPATIENT
Start: 2023-12-20 | End: 2023-12-22

## 2023-12-20 RX ORDER — SODIUM CHLORIDE 9 MG/ML
1000 INJECTION, SOLUTION INTRAVENOUS
Refills: 0 | Status: DISCONTINUED | OUTPATIENT
Start: 2023-12-20 | End: 2023-12-20

## 2023-12-20 RX ORDER — CHLORHEXIDINE GLUCONATE 213 G/1000ML
1 SOLUTION TOPICAL DAILY
Refills: 0 | Status: DISCONTINUED | OUTPATIENT
Start: 2023-12-20 | End: 2023-12-22

## 2023-12-20 RX ORDER — LEVETIRACETAM 250 MG/1
500 TABLET, FILM COATED ORAL EVERY 12 HOURS
Refills: 0 | Status: DISCONTINUED | OUTPATIENT
Start: 2023-12-20 | End: 2023-12-20

## 2023-12-20 RX ORDER — ONDANSETRON 8 MG/1
4 TABLET, FILM COATED ORAL ONCE
Refills: 0 | Status: DISCONTINUED | OUTPATIENT
Start: 2023-12-20 | End: 2023-12-20

## 2023-12-20 RX ADMIN — SODIUM CHLORIDE 75 MILLILITER(S): 9 INJECTION INTRAMUSCULAR; INTRAVENOUS; SUBCUTANEOUS at 16:16

## 2023-12-20 RX ADMIN — HYDROMORPHONE HYDROCHLORIDE 0.5 MILLIGRAM(S): 2 INJECTION INTRAMUSCULAR; INTRAVENOUS; SUBCUTANEOUS at 17:09

## 2023-12-20 RX ADMIN — LEVETIRACETAM 400 MILLIGRAM(S): 250 TABLET, FILM COATED ORAL at 17:08

## 2023-12-20 RX ADMIN — OXYCODONE HYDROCHLORIDE 10 MILLIGRAM(S): 5 TABLET ORAL at 21:00

## 2023-12-20 RX ADMIN — HYDROMORPHONE HYDROCHLORIDE 0.5 MILLIGRAM(S): 2 INJECTION INTRAMUSCULAR; INTRAVENOUS; SUBCUTANEOUS at 18:30

## 2023-12-20 RX ADMIN — Medication 1000 MILLIGRAM(S): at 17:15

## 2023-12-20 RX ADMIN — Medication 400 MILLIGRAM(S): at 16:45

## 2023-12-20 RX ADMIN — OXYCODONE HYDROCHLORIDE 5 MILLIGRAM(S): 5 TABLET ORAL at 23:18

## 2023-12-20 RX ADMIN — OXYCODONE HYDROCHLORIDE 5 MILLIGRAM(S): 5 TABLET ORAL at 22:48

## 2023-12-20 RX ADMIN — Medication 4 MILLIGRAM(S): at 17:08

## 2023-12-20 RX ADMIN — HYDROMORPHONE HYDROCHLORIDE 0.5 MILLIGRAM(S): 2 INJECTION INTRAMUSCULAR; INTRAVENOUS; SUBCUTANEOUS at 18:15

## 2023-12-20 RX ADMIN — OXYCODONE HYDROCHLORIDE 10 MILLIGRAM(S): 5 TABLET ORAL at 20:30

## 2023-12-20 RX ADMIN — HYDROMORPHONE HYDROCHLORIDE 0.5 MILLIGRAM(S): 2 INJECTION INTRAMUSCULAR; INTRAVENOUS; SUBCUTANEOUS at 17:39

## 2023-12-20 RX ADMIN — Medication 100 MILLIGRAM(S): at 21:12

## 2023-12-20 NOTE — PATIENT PROFILE ADULT - FALL HARM RISK - UNIVERSAL INTERVENTIONS
Bed in lowest position, wheels locked, appropriate side rails in place/Call bell, personal items and telephone in reach/Instruct patient to call for assistance before getting out of bed or chair/Non-slip footwear when patient is out of bed/[place] to call system/Physically safe environment - no spills, clutter or unnecessary equipment/Purposeful Proactive Rounding/Room/bathroom lighting operational, light cord in reach Bed in lowest position, wheels locked, appropriate side rails in place/Call bell, personal items and telephone in reach/Instruct patient to call for assistance before getting out of bed or chair/Non-slip footwear when patient is out of bed/[place] to call system/Physically safe environment - no spills, clutter or unnecessary equipment/Purposeful Proactive Rounding/Room/bathroom lighting operational, light cord in reach

## 2023-12-20 NOTE — CHART NOTE - NSCHARTNOTEFT_GEN_A_CORE
CAPRINI SCORE [CLOT] Score on Admission for     AGE RELATED RISK FACTORS                                                       MOBILITY RELATED FACTORS  [ ] Age 41-60 years                                            (1 Point)                  [ ] Bed rest                                                        (1 Point)  [ ] Age: 61-74 years                                           (2 Points)                 [ ] Plaster cast                                                   (2 Points)  [ ] Age= 75 years                                              (3 Points)                 [ ] Bed bound for more than 72 hours                 (2 Points)    DISEASE RELATED RISK FACTORS                                               GENDER SPECIFIC FACTORS  [ ] Edema in the lower extremities                       (1 Point)                  [ ] Pregnancy                                                     (1 Point)  [ ] Varicose veins                                               (1 Point)                  [ ] Post-partum < 6 weeks                                   (1 Point)             [ ] BMI > 25 Kg/m2                                            (1 Point)                  [ ] Hormonal therapy  or oral contraception          (1 Point)                 [ ] Sepsis (in the previous month)                        (1 Point)                  [ ] History of pregnancy complications                 (1 point)  [ ] Pneumonia or serious lung disease                                               [ ] Unexplained or recurrent                     (1 Point)           (in the previous month)                               (1 Point)  [ ] Abnormal pulmonary function test                     (1 Point)                 SURGERY RELATED RISK FACTORS (include planned surgeries)  [ ] Acute myocardial infarction                              (1 Point)                 [ ]  Section                                             (1 Point)  [ ] Congestive heart failure (in the previous month)  (1 Point)         [ ] Minor surgery                                                  (1 Point)   [ ] Inflammatory bowel disease                             (1 Point)                 [ ] Arthroscopic surgery                                        (2 Points)  [ ] Central venous access                                      (2 Points)                [ x] General surgery lasting more than 45 minutes   (2 Points)       [ ] Stroke (in the previous month)                          (5 Points)               [ ] Elective arthroplasty                                         (5 Points)            [ ] current or past malignancy                              (2 Points)                                                                                                       HEMATOLOGY RELATED FACTORS                                                 TRAUMA RELATED RISK FACTORS  [ ] Prior episodes of VTE                                     (3 Points)                [ ] Fracture of the hip, pelvis, or leg                       (5 Points)  [ ] Positive family history for VTE                         (3 Points)                 [ ] Acute spinal cord injury (in the previous month)  (5 Points)  [ ] Prothrombin 29369 A                                     (3 Points)                 [ ] Paralysis  (less than 1 month)                             (5 Points)  [ ] Factor V Leiden                                             (3 Points)                  [ ] Multiple Trauma within 1 month                        (5 Points)  [ ] Lupus anticoagulants                                     (3 Points)                                                           [ ] Anticardiolipin antibodies                               (3 Points)                                                       [ ] High homocysteine in the blood                      (3 Points)                                             [ ] Other congenital or acquired thrombophilia      (3 Points)                                                [ ] Heparin induced thrombocytopenia                  (3 Points)                                          Total Score [    2      ]    Risk:  Very low 0   Low 1 to 2   Moderate 3 to 4   High =5       VTE Prophylasix Recommednations:  [x ] mechanical pneumatic compression devices                                      [ ] contraindicated: _____________________  [ ] chemo prophylasix                                                                                   [x ] contraindicated _____________________    **** HIGH LIKELIHOOD DVT PRESENT ON ADMISSION  [ ] (please order LE dopplers within 24 hours of admission) CAPRINI SCORE [CLOT] Score on Admission for     AGE RELATED RISK FACTORS                                                       MOBILITY RELATED FACTORS  [ ] Age 41-60 years                                            (1 Point)                  [ ] Bed rest                                                        (1 Point)  [ ] Age: 61-74 years                                           (2 Points)                 [ ] Plaster cast                                                   (2 Points)  [ ] Age= 75 years                                              (3 Points)                 [ ] Bed bound for more than 72 hours                 (2 Points)    DISEASE RELATED RISK FACTORS                                               GENDER SPECIFIC FACTORS  [ ] Edema in the lower extremities                       (1 Point)                  [ ] Pregnancy                                                     (1 Point)  [ ] Varicose veins                                               (1 Point)                  [ ] Post-partum < 6 weeks                                   (1 Point)             [ ] BMI > 25 Kg/m2                                            (1 Point)                  [ ] Hormonal therapy  or oral contraception          (1 Point)                 [ ] Sepsis (in the previous month)                        (1 Point)                  [ ] History of pregnancy complications                 (1 point)  [ ] Pneumonia or serious lung disease                                               [ ] Unexplained or recurrent                     (1 Point)           (in the previous month)                               (1 Point)  [ ] Abnormal pulmonary function test                     (1 Point)                 SURGERY RELATED RISK FACTORS (include planned surgeries)  [ ] Acute myocardial infarction                              (1 Point)                 [ ]  Section                                             (1 Point)  [ ] Congestive heart failure (in the previous month)  (1 Point)         [ ] Minor surgery                                                  (1 Point)   [ ] Inflammatory bowel disease                             (1 Point)                 [ ] Arthroscopic surgery                                        (2 Points)  [ ] Central venous access                                      (2 Points)                [ x] General surgery lasting more than 45 minutes   (2 Points)       [ ] Stroke (in the previous month)                          (5 Points)               [ ] Elective arthroplasty                                         (5 Points)            [ ] current or past malignancy                              (2 Points)                                                                                                       HEMATOLOGY RELATED FACTORS                                                 TRAUMA RELATED RISK FACTORS  [ ] Prior episodes of VTE                                     (3 Points)                [ ] Fracture of the hip, pelvis, or leg                       (5 Points)  [ ] Positive family history for VTE                         (3 Points)                 [ ] Acute spinal cord injury (in the previous month)  (5 Points)  [ ] Prothrombin 11445 A                                     (3 Points)                 [ ] Paralysis  (less than 1 month)                             (5 Points)  [ ] Factor V Leiden                                             (3 Points)                  [ ] Multiple Trauma within 1 month                        (5 Points)  [ ] Lupus anticoagulants                                     (3 Points)                                                           [ ] Anticardiolipin antibodies                               (3 Points)                                                       [ ] High homocysteine in the blood                      (3 Points)                                             [ ] Other congenital or acquired thrombophilia      (3 Points)                                                [ ] Heparin induced thrombocytopenia                  (3 Points)                                          Total Score [    2      ]    Risk:  Very low 0   Low 1 to 2   Moderate 3 to 4   High =5       VTE Prophylasix Recommednations:  [x ] mechanical pneumatic compression devices                                      [ ] contraindicated: _____________________  [ ] chemo prophylasix                                                                                   [x ] contraindicated _____________________    **** HIGH LIKELIHOOD DVT PRESENT ON ADMISSION  [ ] (please order LE dopplers within 24 hours of admission)

## 2023-12-20 NOTE — PROGRESS NOTE ADULT - ASSESSMENT
ASSESSMENT/PLAN:   POD 0 - craniotomy for resection of colloid cyst     NEURO:  CT Head in AM  MRI post-op  Surgical drains per NSGY  tylenol and oxycodone PRN   Activity: [] OOB as tolerated [] Bedrest [] PT [] OT [] PMNR    PULM:  Incentive spirometry, mobilize as tolerated    CV:  -160mmHg    RENAL:  IVF until good PO intake  d/c meade in AM    GI:  Diet: Dysphagia screen and then advance diet as tolerated  sennna and miralax for bowel regimen  Last Bowel Movement:   GI prophylaxis [] not indicated [] PPI [] other:    ENDO:   Goal euglycemia (-180)    HEME/ONC:  VTE prophylaxis: [X] SCDs [] chemoprophylaxis [] hold chemoprophylaxis due to: [] high risk of DVT/PE on admission due to:    ID:  Hortensia-op antibiotics

## 2023-12-20 NOTE — PROVIDER CONTACT NOTE (OTHER) - ACTION/TREATMENT ORDERED:
Neuro team notified and at bedside. Dr. Rashid at bedside to discuss with patient. Patient agreeable to proceed with surgery today. Emotional support provided.

## 2023-12-20 NOTE — PROGRESS NOTE ADULT - SUBJECTIVE AND OBJECTIVE BOX
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 Chinchilla on 5/8/2023. Since then he has chief complaint of sudden dropping/fainting episodes. It is so severe it has impacted his quality of life poorly. These episodes are spontaneous, non provoked and can occur multiple times per day. He has a known third ventricle colloid cyst and consulted with Dr. Dominguez for resection. He presents to Presurgical Testing for evaluation prior to Right Inter Hemispheric Transcallosal Approach For Craniotomy of Colloid Cyst on 11/22/23.     HOSPITAL COURSE:  12/20- POD0 resection of colloid cyst      Allergies    No Known Allergies    Intolerances        REVIEW OF SYSTEMS: [ ] Unable to Assess due to neurologic exam   [X] All ROS addressed below are non-contributory, except:  Neuro: [ ] Headache [ ] Back pain [ ] Numbness [ ] Weakness [ ] Ataxia [ ] Dizziness [ ] Aphasia [ ] Dysarthria [ ] Visual disturbance  Resp: [ ] Shortness of breath/dyspnea, [ ] Orthopnea [ ] Cough  CV: [ ] Chest pain [ ] Palpitation [ ] Lightheadedness [ ] Syncope  Renal: [ ] Thirst [ ] Edema  GI: [ ] Nausea [ ] Emesis [ ] Abdominal pain [ ] Constipation [ ] Diarrhea  Hem: [ ] Hematemesis [ ] bright red blood per rectum  ID: [ ] Fever [ ] Chills [ ] Dysuria  ENT: [ ] Rhinorrhea      DEVICES:   [ ] Restraints [ ] ET tube [ ] central line [ ] arterial line [ ] meade [ ] NGT/OGT [ ] EVD [ ] LD [ ] CARY/HMV [ ] Trach [ ] PEG [ ] Chest Tube     VITALS:   Vital Signs Last 24 Hrs  T(C): 36.5 (20 Dec 2023 16:25), Max: 37.1 (20 Dec 2023 15:30)  T(F): 97.7 (20 Dec 2023 16:25), Max: 98.8 (20 Dec 2023 15:30)  HR: 93 (20 Dec 2023 16:25) (80 - 120)  BP: 124/77 (20 Dec 2023 16:00) (119/73 - 127/79)  BP(mean): 94 (20 Dec 2023 16:00) (88 - 95)  RR: 24 (20 Dec 2023 16:25) (15 - 24)  SpO2: 99% (20 Dec 2023 16:25) (99% - 100%)    Parameters below as of 20 Dec 2023 16:25  Patient On (Oxygen Delivery Method): room air      CAPILLARY BLOOD GLUCOSE        I&O's Summary    20 Dec 2023 07:01  -  20 Dec 2023 17:13  --------------------------------------------------------  IN: 75 mL / OUT: 300 mL / NET: -225 mL        LABS:                        14.3   7.42  )-----------( 288      ( 20 Dec 2023 16:07 )             40.6     12-20    145  |  104  |  15  ----------------------------<  147<H>  4.4   |  26  |  1.05       MEDICATION LEVELS:     IVF FLUIDS/MEDICATIONS:   MEDICATIONS  (STANDING):  acetaminophen   IVPB .. 1000 milliGRAM(s) IV Intermittent once  ceFAZolin   IVPB 2000 milliGRAM(s) IV Intermittent every 8 hours  chlorhexidine 4% Liquid 1 Application(s) Topical daily  dexAMETHasone  Injectable 4 milliGRAM(s) IV Push every 6 hours  influenza   Vaccine 0.5 milliLiter(s) IntraMuscular once  levETIRAcetam  IVPB 500 milliGRAM(s) IV Intermittent two times a day  pantoprazole    Tablet 40 milliGRAM(s) Oral before breakfast  sodium chloride 0.9%. 1000 milliLiter(s) (75 mL/Hr) IV Continuous <Continuous>    MEDICATIONS  (PRN):  acetaminophen     Tablet .. 650 milliGRAM(s) Oral every 6 hours PRN Mild Pain (1 - 3)  oxyCODONE    IR 5 milliGRAM(s) Oral every 4 hours PRN Moderate Pain (4 - 6)        IMAGING:      EXAMINATION:  PHYSICAL EXAM:    Constitutional: No Acute Distress     Neurological: Awake, alert oriented to person, place and time, Following Commands, PERRL, EOMI, No Gaze Preference, Face Symmetrical, Speech Fluent, No dysmetria, No ataxia, No nystagmus     Motor exam:          Upper extremity                         Delt     Bicep     Tricep    HG                                                 R         5/5 5/5 5/5 5/5                                               L          5/5 5/5 5/5 5/5          Lower extremity                        HF         KF        KE       DF         PF                                                  R        5/5 5/5 5/5 5/5 5/5                                               L         5/5 5/5 5/5 5/5 5/5                                                 Sensation: [ ] intact to light touch  [ ] decreased:     Reflexes: Deep Tendon Reflexes Intact     Pulmonary: Clear to Auscultation, No rales, No rhonchi, No wheezes     Cardiovascular: S1, S2, Regular rate and rhythm     Gastrointestinal: Soft, Non-tender, Non-distended     Extremities: No calf tenderness     Incision:    39 yo 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 Chinchilla on 5/8/2023. Since then he has chief complaint of sudden dropping/fainting episodes. It is so severe it has impacted his quality of life poorly. These episodes are spontaneous, non provoked and can occur multiple times per day. He has a known third ventricle colloid cyst and consulted with Dr. Dominguez for resection. He presents to Presurgical Testing for evaluation prior to Right Inter Hemispheric Transcallosal Approach For Craniotomy of Colloid Cyst on 11/22/23.     HOSPITAL COURSE:  12/20- POD0 resection of colloid cyst      Allergies    No Known Allergies    Intolerances        REVIEW OF SYSTEMS: [ ] Unable to Assess due to neurologic exam   [X] All ROS addressed below are non-contributory, except:  Neuro: [ ] Headache [ ] Back pain [ ] Numbness [ ] Weakness [ ] Ataxia [ ] Dizziness [ ] Aphasia [ ] Dysarthria [ ] Visual disturbance  Resp: [ ] Shortness of breath/dyspnea, [ ] Orthopnea [ ] Cough  CV: [ ] Chest pain [ ] Palpitation [ ] Lightheadedness [ ] Syncope  Renal: [ ] Thirst [ ] Edema  GI: [ ] Nausea [ ] Emesis [ ] Abdominal pain [ ] Constipation [ ] Diarrhea  Hem: [ ] Hematemesis [ ] bright red blood per rectum  ID: [ ] Fever [ ] Chills [ ] Dysuria  ENT: [ ] Rhinorrhea      DEVICES:   [ ] Restraints [ ] ET tube [ ] central line [ ] arterial line [ ] meade [ ] NGT/OGT [ ] EVD [ ] LD [ ] CARY/HMV [ ] Trach [ ] PEG [ ] Chest Tube     VITALS:   Vital Signs Last 24 Hrs  T(C): 36.5 (20 Dec 2023 16:25), Max: 37.1 (20 Dec 2023 15:30)  T(F): 97.7 (20 Dec 2023 16:25), Max: 98.8 (20 Dec 2023 15:30)  HR: 93 (20 Dec 2023 16:25) (80 - 120)  BP: 124/77 (20 Dec 2023 16:00) (119/73 - 127/79)  BP(mean): 94 (20 Dec 2023 16:00) (88 - 95)  RR: 24 (20 Dec 2023 16:25) (15 - 24)  SpO2: 99% (20 Dec 2023 16:25) (99% - 100%)    Parameters below as of 20 Dec 2023 16:25  Patient On (Oxygen Delivery Method): room air      CAPILLARY BLOOD GLUCOSE        I&O's Summary    20 Dec 2023 07:01  -  20 Dec 2023 17:13  --------------------------------------------------------  IN: 75 mL / OUT: 300 mL / NET: -225 mL        LABS:                        14.3   7.42  )-----------( 288      ( 20 Dec 2023 16:07 )             40.6     12-20    145  |  104  |  15  ----------------------------<  147<H>  4.4   |  26  |  1.05       MEDICATION LEVELS:     IVF FLUIDS/MEDICATIONS:   MEDICATIONS  (STANDING):  acetaminophen   IVPB .. 1000 milliGRAM(s) IV Intermittent once  ceFAZolin   IVPB 2000 milliGRAM(s) IV Intermittent every 8 hours  chlorhexidine 4% Liquid 1 Application(s) Topical daily  dexAMETHasone  Injectable 4 milliGRAM(s) IV Push every 6 hours  influenza   Vaccine 0.5 milliLiter(s) IntraMuscular once  levETIRAcetam  IVPB 500 milliGRAM(s) IV Intermittent two times a day  pantoprazole    Tablet 40 milliGRAM(s) Oral before breakfast  sodium chloride 0.9%. 1000 milliLiter(s) (75 mL/Hr) IV Continuous <Continuous>    MEDICATIONS  (PRN):  acetaminophen     Tablet .. 650 milliGRAM(s) Oral every 6 hours PRN Mild Pain (1 - 3)  oxyCODONE    IR 5 milliGRAM(s) Oral every 4 hours PRN Moderate Pain (4 - 6)        IMAGING:      EXAMINATION:  PHYSICAL EXAM:    Constitutional: No Acute Distress     Neurological: Awake, alert oriented to person, place and time, Following Commands, PERRL, EOMI, No Gaze Preference, Face Symmetrical, Speech Fluent, No dysmetria, No ataxia, No nystagmus     Motor exam:          Upper extremity                         Delt     Bicep     Tricep    HG                                                 R         5/5 5/5 5/5 5/5                                               L          5/5 5/5 5/5 5/5          Lower extremity                        HF         KF        KE       DF         PF                                                  R        5/5 5/5 5/5 5/5 5/5                                               L         5/5 5/5 5/5 5/5 5/5                                                 Sensation: [ ] intact to light touch  [ ] decreased:     Reflexes: Deep Tendon Reflexes Intact     Pulmonary: Clear to Auscultation, No rales, No rhonchi, No wheezes     Cardiovascular: S1, S2, Regular rate and rhythm     Gastrointestinal: Soft, Non-tender, Non-distended     Extremities: No calf tenderness     Incision: dry and clean

## 2023-12-20 NOTE — PRE-OP CHECKLIST - NS PREOP CHK MONITOR ANESTHESIA CONSENT
Message   Recorded as Task   Date: 11/27/2018 01:52 PM, Created By: Karime Rashid   Task Name: 4. Patient Message   Assigned To: ALISON ODEN   Regarding Patient: BRIAN GOLDSTEIN, Status: Active   Comment:    Karime Rashid - 27 Nov 2018 1:52 PM     Patient Message to Provider  \"REASON FOR CALL: Gabriella/daughter is calling to state she needs to speak to doctor about patient cough and will like to get some advice on if the patient should be taken to the Emergency room. Please call to advise     CALLER'S RELATIONSHIP TO PATIENT:    IF OTHER, NAME AND RELATIONSHIP: Gabriella     BEST NUMBER TO BE CONTACTED: [phone]  ALTERNATIVE PHONE NUMBER:      Turnaround time given to caller:  \"\"THIS MESSAGE WILL BE SENT TO  ,,, (state provider's first and last name) ,,,,, THE CLINICAL TEAM WILL RETURN YOUR CALL AS SOON AS THEY HAVE REVIEWED YOUR MESSAGE\"\"    READ BACK MESSAGE TO PATIENT\"   Katelyn Johnson - 27 Nov 2018 1:54 PM     UNDELEGATED TASK   Katelyn Johnson - 27 Nov 2018 1:54 PM     TASK REASSIGNED: Previously Assigned To HANNY PIKE     is on abx for 3 days (Levaquin)  she is coughing nonstop overnight  she is wheezing and feet more swollen     Discussion/Summary  agree with taking her to the ER for evaluation       Signatures   Electronically signed by : ALISON ODEN MD; Nov 27 2018  3:32PM CST (Author)     done

## 2023-12-20 NOTE — PROGRESS NOTE ADULT - SUBJECTIVE AND OBJECTIVE BOX
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 Chinchilla on 5/8/2023. Since then he has chief complaint of sudden dropping/fainting episodes. It is so severe it has impacted his quality of life poorly. These episodes are spontaneous, non provoked and can occur multiple times per day. He has a known third ventricle colloid cyst and consulted with Dr. Dominguez for resection. He presents to Presurgical Testing for evaluation prior to Right Inter Hemispheric Transcallosal Approach For Craniotomy of Colloid Cyst on 11/22/23.     HOSPITAL COURSE:  12/20- POD0 resection of colloid cyst      Allergies    No Known Allergies    Intolerances        REVIEW OF SYSTEMS: [ ] Unable to Assess due to neurologic exam   [X] All ROS addressed below are non-contributory, except:  Neuro: [ ] Headache [ ] Back pain [ ] Numbness [ ] Weakness [ ] Ataxia [ ] Dizziness [ ] Aphasia [ ] Dysarthria [ ] Visual disturbance  Resp: [ ] Shortness of breath/dyspnea, [ ] Orthopnea [ ] Cough  CV: [ ] Chest pain [ ] Palpitation [ ] Lightheadedness [ ] Syncope  Renal: [ ] Thirst [ ] Edema  GI: [ ] Nausea [ ] Emesis [ ] Abdominal pain [ ] Constipation [ ] Diarrhea  Hem: [ ] Hematemesis [ ] bright red blood per rectum  ID: [ ] Fever [ ] Chills [ ] Dysuria  ENT: [ ] Rhinorrhea      DEVICES:   [ ] Restraints [ ] ET tube [ ] central line [ ] arterial line [ ] meade [ ] NGT/OGT [ ] EVD [ ] LD [ ] CARY/HMV [ ] Trach [ ] PEG [ ] Chest Tube     VITALS:   Vital Signs Last 24 Hrs  T(C): 36.5 (20 Dec 2023 16:25), Max: 37.1 (20 Dec 2023 15:30)  T(F): 97.7 (20 Dec 2023 16:25), Max: 98.8 (20 Dec 2023 15:30)  HR: 93 (20 Dec 2023 16:25) (80 - 120)  BP: 124/77 (20 Dec 2023 16:00) (119/73 - 127/79)  BP(mean): 94 (20 Dec 2023 16:00) (88 - 95)  RR: 24 (20 Dec 2023 16:25) (15 - 24)  SpO2: 99% (20 Dec 2023 16:25) (99% - 100%)    Parameters below as of 20 Dec 2023 16:25  Patient On (Oxygen Delivery Method): room air      CAPILLARY BLOOD GLUCOSE        I&O's Summary    20 Dec 2023 07:01  -  20 Dec 2023 17:13  --------------------------------------------------------  IN: 75 mL / OUT: 300 mL / NET: -225 mL        LABS:                        14.3   7.42  )-----------( 288      ( 20 Dec 2023 16:07 )             40.6     12-20    145  |  104  |  15  ----------------------------<  147<H>  4.4   |  26  |  1.05       MEDICATION LEVELS:     IVF FLUIDS/MEDICATIONS:   MEDICATIONS  (STANDING):  acetaminophen   IVPB .. 1000 milliGRAM(s) IV Intermittent once  ceFAZolin   IVPB 2000 milliGRAM(s) IV Intermittent every 8 hours  chlorhexidine 4% Liquid 1 Application(s) Topical daily  dexAMETHasone  Injectable 4 milliGRAM(s) IV Push every 6 hours  influenza   Vaccine 0.5 milliLiter(s) IntraMuscular once  levETIRAcetam  IVPB 500 milliGRAM(s) IV Intermittent two times a day  pantoprazole    Tablet 40 milliGRAM(s) Oral before breakfast  sodium chloride 0.9%. 1000 milliLiter(s) (75 mL/Hr) IV Continuous <Continuous>    MEDICATIONS  (PRN):  acetaminophen     Tablet .. 650 milliGRAM(s) Oral every 6 hours PRN Mild Pain (1 - 3)  oxyCODONE    IR 5 milliGRAM(s) Oral every 4 hours PRN Moderate Pain (4 - 6)        IMAGING:      EXAMINATION:  PHYSICAL EXAM:    Constitutional: No Acute Distress     Neurological: Awake, alert oriented to person, place and time, Following Commands, PERRL, EOMI, No Gaze Preference, Face Symmetrical, Speech Fluent, No dysmetria, No ataxia, No nystagmus     Motor exam:          Upper extremity                         Delt     Bicep     Tricep    HG                                                 R         5/5 5/5 5/5 5/5                                               L          5/5 5/5 5/5 5/5          Lower extremity                        HF         KF        KE       DF         PF                                                  R        5/5 5/5 5/5 5/5 5/5                                               L         5/5 5/5 5/5 5/5 5/5                                                 Sensation: [ ] intact to light touch  [ ] decreased:     Reflexes: Deep Tendon Reflexes Intact     Pulmonary: Clear to Auscultation, No rales, No rhonchi, No wheezes     Cardiovascular: S1, S2, Regular rate and rhythm     Gastrointestinal: Soft, Non-tender, Non-distended     Extremities: No calf tenderness     Incision: dry and clean   37 yo 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 Chinchilla on 5/8/2023. Since then he has chief complaint of sudden dropping/fainting episodes. It is so severe it has impacted his quality of life poorly. These episodes are spontaneous, non provoked and can occur multiple times per day. He has a known third ventricle colloid cyst and consulted with Dr. Dominguez for resection. He presents to Presurgical Testing for evaluation prior to Right Inter Hemispheric Transcallosal Approach For Craniotomy of Colloid Cyst on 11/22/23.     HOSPITAL COURSE:  12/20- POD0 resection of colloid cyst  12/120: OVERNIGHT: No acute events. No further seizure like episodes       Allergies    No Known Allergies    Intolerances        REVIEW OF SYSTEMS: [ ] Unable to Assess due to neurologic exam   [X] All ROS addressed below are non-contributory, except:  Neuro: [ ] Headache [ ] Back pain [ ] Numbness [ ] Weakness [ ] Ataxia [ ] Dizziness [ ] Aphasia [ ] Dysarthria [ ] Visual disturbance  Resp: [ ] Shortness of breath/dyspnea, [ ] Orthopnea [ ] Cough  CV: [ ] Chest pain [ ] Palpitation [ ] Lightheadedness [ ] Syncope  Renal: [ ] Thirst [ ] Edema  GI: [ ] Nausea [ ] Emesis [ ] Abdominal pain [ ] Constipation [ ] Diarrhea  Hem: [ ] Hematemesis [ ] bright red blood per rectum  ID: [ ] Fever [ ] Chills [ ] Dysuria  ENT: [ ] Rhinorrhea      DEVICES:   [ ] Restraints [ ] ET tube [ ] central line [ ] arterial line [ ] meade [ ] NGT/OGT [ ] EVD [ ] LD [ ] CARY/HMV [ ] Trach [ ] PEG [ ] Chest Tube     VITALS:   Vital Signs Last 24 Hrs  T(C): 36.5 (20 Dec 2023 16:25), Max: 37.1 (20 Dec 2023 15:30)  T(F): 97.7 (20 Dec 2023 16:25), Max: 98.8 (20 Dec 2023 15:30)  HR: 93 (20 Dec 2023 16:25) (80 - 120)  BP: 124/77 (20 Dec 2023 16:00) (119/73 - 127/79)  BP(mean): 94 (20 Dec 2023 16:00) (88 - 95)  RR: 24 (20 Dec 2023 16:25) (15 - 24)  SpO2: 99% (20 Dec 2023 16:25) (99% - 100%)    Parameters below as of 20 Dec 2023 16:25  Patient On (Oxygen Delivery Method): room air      CAPILLARY BLOOD GLUCOSE        I&O's Summary    20 Dec 2023 07:01  -  20 Dec 2023 17:13  --------------------------------------------------------  IN: 75 mL / OUT: 300 mL / NET: -225 mL        LABS:                        14.3   7.42  )-----------( 288      ( 20 Dec 2023 16:07 )             40.6     12-20    145  |  104  |  15  ----------------------------<  147<H>  4.4   |  26  |  1.05       MEDICATION LEVELS:     IVF FLUIDS/MEDICATIONS:   MEDICATIONS  (STANDING):  acetaminophen   IVPB .. 1000 milliGRAM(s) IV Intermittent once  ceFAZolin   IVPB 2000 milliGRAM(s) IV Intermittent every 8 hours  chlorhexidine 4% Liquid 1 Application(s) Topical daily  dexAMETHasone  Injectable 4 milliGRAM(s) IV Push every 6 hours  influenza   Vaccine 0.5 milliLiter(s) IntraMuscular once  levETIRAcetam  IVPB 500 milliGRAM(s) IV Intermittent two times a day  pantoprazole    Tablet 40 milliGRAM(s) Oral before breakfast  sodium chloride 0.9%. 1000 milliLiter(s) (75 mL/Hr) IV Continuous <Continuous>    MEDICATIONS  (PRN):  acetaminophen     Tablet .. 650 milliGRAM(s) Oral every 6 hours PRN Mild Pain (1 - 3)  oxyCODONE    IR 5 milliGRAM(s) Oral every 4 hours PRN Moderate Pain (4 - 6)        IMAGING:      EXAMINATION:  PHYSICAL EXAM:    Constitutional: No Acute Distress     Neurological: Awake, alert oriented to person, place and time, Following Commands, PERRL, EOMI, No Gaze Preference, Face Symmetrical, Speech Fluent, No dysmetria, No ataxia, No nystagmus     Motor exam:          Upper extremity                         Delt     Bicep     Tricep    HG                                                 R         5/5 5/5 5/5 5/5                                               L          5/5 5/5 5/5 5/5          Lower extremity                        HF         KF        KE       DF         PF                                                  R        5/5 5/5 5/5 5/5 5/5                                               L         5/5 5/5 5/5 5/5 5/5                                                 Sensation: [ ] intact to light touch  [ ] decreased:     Reflexes: Deep Tendon Reflexes Intact     Pulmonary: Clear to Auscultation, No rales, No rhonchi, No wheezes     Cardiovascular: S1, S2, Regular rate and rhythm     Gastrointestinal: Soft, Non-tender, Non-distended     Extremities: No calf tenderness     Incision: dry and clean

## 2023-12-20 NOTE — PATIENT PROFILE ADULT - LIFE CHALLENGES - DETAILS
Admission/Transfer from:ED  2 RN skin assessment completed. Yes  Significant findings include: Bruising, rash  WOC Nurse Consult Ordered? No                  Unable to work or drive due to health issues

## 2023-12-20 NOTE — PROVIDER CONTACT NOTE (OTHER) - BACKGROUND
Patient states the cyst in his brain has disrupted his life. He states it causes his to "pass out" multiple times a day. Due to this he has not been able to drive, or work. Worries about scar post op.

## 2023-12-20 NOTE — PRE-ANESTHESIA EVALUATION ADULT - NSANTHAIRWAYFT_ENT_ALL_CORE
Good mouth opening  Fish Good mouth opening  Beard  full neck extension, no limits to neck ROM; denies h/o c-spine injury or surgery

## 2023-12-20 NOTE — CHART NOTE - NSCHARTNOTEFT_GEN_A_CORE
PT IN BED SLEEPING WITH IV IN PLACE AND PATEN WITH 1/2 NS AT 30 TO RIGHT FA.
NPO AT MIDNIGHT. NO S/SOF DISTRESS. ALERT AND ORENTED ABLE TO VOICE NEEDS AND
WANTS TO STAFF. WILL CONTIUE WITH PLAIN OF CARE. EEG preliminary read (not final) on the initial recording hour(s) = x 1     No seizures recorded.  Moderate slowing noted, nonspecific.    Final report to follow tomorrow morning after completion of study.    [facility] EEG Reading Room Ph#: [phone]  Epilepsy Answering Service after 5PM and before 8:30AM: Ph#: [phone] EEG preliminary read (not final) on the initial recording hour(s) = x 1     No seizures recorded.  Moderate slowing noted, nonspecific.    Final report to follow tomorrow morning after completion of study.    [facility] EEG Reading Room Ph#: [phone]  Epilepsy Answering Service after 5PM and before 8:30AM: Ph#: [phone]

## 2023-12-20 NOTE — PROGRESS NOTE ADULT - ASSESSMENT
ASSESSMENT/PLAN:   POD 0 - craniotomy for resection of colloid cyst     NEURO:  CT Head in AM  MRI post-op  Surgical drains per NSGY  tylenol and oxycodone PRN   Activity: [] OOB as tolerated [] Bedrest [] PT [] OT [] PMNR    PULM:  Incentive spirometry, mobilize as tolerated    CV:  -160mmHg    RENAL:  IVF until good PO intake  d/c meade in AM    GI:  Diet: Dysphagia screen and then advance diet as tolerated  sennna and miralax for bowel regimen  Last Bowel Movement:   GI prophylaxis [] not indicated [] PPI [] other:    ENDO:   Goal euglycemia (-180)    HEME/ONC:  VTE prophylaxis: [X] SCDs [] chemoprophylaxis [] hold chemoprophylaxis due to: [] high risk of DVT/PE on admission due to:    ID:  Hortensia-op antibiotics ASSESSMENT/PLAN:   POD 0 - craniotomy for resection of colloid cyst     NEURO:  CT Head in AM  MRI post-op  Surgical drains per NSGY  tylenol and oxycodone PRN   VEEG running, f/u read  Keppra 500 BID  Activity: [x] OOB as tolerated [] Bedrest [x] PT [x] OT [] PMNR    PULM:  Incentive spirometry, mobilize as tolerated    CV:  -160mmHg    RENAL:  IVL  d/c meade   GI:  Diet: Regular diet  sennna and miralax for bowel regimen  Last Bowel Movement:   GI prophylaxis [] not indicated [] PPI [] other:    ENDO:   Goal euglycemia (-180)    HEME/ONC:  VTE prophylaxis: [X] SCDs [] chemoprophylaxis [] hold chemoprophylaxis due to: [] high risk of DVT/PE on admission due to:  post-op 24 hours    ID:  Hortensia-op antibiotics

## 2023-12-20 NOTE — PRE-ANESTHESIA EVALUATION ADULT - NSANTHPEFT_GEN_ALL_CORE
Gen: Flat affect, responses limited to one word  Neck: FROM  CV: RRR  CTA B/L
Alert-The patient is alert, awake and responds to voice. The patient is oriented to time, place, and person. The triage nurse is able to obtain subjective information.

## 2023-12-21 LAB
ANION GAP SERPL CALC-SCNC: 9 MMOL/L — SIGNIFICANT CHANGE UP (ref 5–17)
ANION GAP SERPL CALC-SCNC: 9 MMOL/L — SIGNIFICANT CHANGE UP (ref 5–17)
BUN SERPL-MCNC: 9 MG/DL — SIGNIFICANT CHANGE UP (ref 7–23)
BUN SERPL-MCNC: 9 MG/DL — SIGNIFICANT CHANGE UP (ref 7–23)
CALCIUM SERPL-MCNC: 9.1 MG/DL — SIGNIFICANT CHANGE UP (ref 8.4–10.5)
CALCIUM SERPL-MCNC: 9.1 MG/DL — SIGNIFICANT CHANGE UP (ref 8.4–10.5)
CHLORIDE SERPL-SCNC: 104 MMOL/L — SIGNIFICANT CHANGE UP (ref 96–108)
CHLORIDE SERPL-SCNC: 104 MMOL/L — SIGNIFICANT CHANGE UP (ref 96–108)
CO2 SERPL-SCNC: 29 MMOL/L — SIGNIFICANT CHANGE UP (ref 22–31)
CO2 SERPL-SCNC: 29 MMOL/L — SIGNIFICANT CHANGE UP (ref 22–31)
CREAT SERPL-MCNC: 0.83 MG/DL — SIGNIFICANT CHANGE UP (ref 0.5–1.3)
CREAT SERPL-MCNC: 0.83 MG/DL — SIGNIFICANT CHANGE UP (ref 0.5–1.3)
EGFR: 115 ML/MIN/1.73M2 — SIGNIFICANT CHANGE UP
EGFR: 115 ML/MIN/1.73M2 — SIGNIFICANT CHANGE UP
GLUCOSE SERPL-MCNC: 144 MG/DL — HIGH (ref 70–99)
GLUCOSE SERPL-MCNC: 144 MG/DL — HIGH (ref 70–99)
MAGNESIUM SERPL-MCNC: 2.4 MG/DL — SIGNIFICANT CHANGE UP (ref 1.6–2.6)
MAGNESIUM SERPL-MCNC: 2.4 MG/DL — SIGNIFICANT CHANGE UP (ref 1.6–2.6)
PHOSPHATE SERPL-MCNC: 3.9 MG/DL — SIGNIFICANT CHANGE UP (ref 2.5–4.5)
PHOSPHATE SERPL-MCNC: 3.9 MG/DL — SIGNIFICANT CHANGE UP (ref 2.5–4.5)
POTASSIUM SERPL-MCNC: 4.2 MMOL/L — SIGNIFICANT CHANGE UP (ref 3.5–5.3)
POTASSIUM SERPL-MCNC: 4.2 MMOL/L — SIGNIFICANT CHANGE UP (ref 3.5–5.3)
POTASSIUM SERPL-SCNC: 4.2 MMOL/L — SIGNIFICANT CHANGE UP (ref 3.5–5.3)
POTASSIUM SERPL-SCNC: 4.2 MMOL/L — SIGNIFICANT CHANGE UP (ref 3.5–5.3)
SODIUM SERPL-SCNC: 142 MMOL/L — SIGNIFICANT CHANGE UP (ref 135–145)
SODIUM SERPL-SCNC: 142 MMOL/L — SIGNIFICANT CHANGE UP (ref 135–145)

## 2023-12-21 PROCEDURE — 70553 MRI BRAIN STEM W/O & W/DYE: CPT | Mod: 26

## 2023-12-21 PROCEDURE — 70450 CT HEAD/BRAIN W/O DYE: CPT | Mod: 26

## 2023-12-21 PROCEDURE — 99222 1ST HOSP IP/OBS MODERATE 55: CPT | Mod: GC

## 2023-12-21 PROCEDURE — 99233 SBSQ HOSP IP/OBS HIGH 50: CPT

## 2023-12-21 RX ORDER — ENOXAPARIN SODIUM 100 MG/ML
40 INJECTION SUBCUTANEOUS
Refills: 0 | Status: DISCONTINUED | OUTPATIENT
Start: 2023-12-21 | End: 2023-12-22

## 2023-12-21 RX ADMIN — POLYETHYLENE GLYCOL 3350 17 GRAM(S): 17 POWDER, FOR SOLUTION ORAL at 11:16

## 2023-12-21 RX ADMIN — Medication 4 MILLIGRAM(S): at 18:24

## 2023-12-21 RX ADMIN — OXYCODONE HYDROCHLORIDE 5 MILLIGRAM(S): 5 TABLET ORAL at 22:39

## 2023-12-21 RX ADMIN — OXYCODONE HYDROCHLORIDE 10 MILLIGRAM(S): 5 TABLET ORAL at 14:47

## 2023-12-21 RX ADMIN — OXYCODONE HYDROCHLORIDE 10 MILLIGRAM(S): 5 TABLET ORAL at 12:15

## 2023-12-21 RX ADMIN — PANTOPRAZOLE SODIUM 40 MILLIGRAM(S): 20 TABLET, DELAYED RELEASE ORAL at 06:08

## 2023-12-21 RX ADMIN — LEVETIRACETAM 500 MILLIGRAM(S): 250 TABLET, FILM COATED ORAL at 18:24

## 2023-12-21 RX ADMIN — OXYCODONE HYDROCHLORIDE 5 MILLIGRAM(S): 5 TABLET ORAL at 18:54

## 2023-12-21 RX ADMIN — Medication 4 MILLIGRAM(S): at 06:08

## 2023-12-21 RX ADMIN — ENOXAPARIN SODIUM 40 MILLIGRAM(S): 100 INJECTION SUBCUTANEOUS at 18:24

## 2023-12-21 RX ADMIN — Medication 650 MILLIGRAM(S): at 09:02

## 2023-12-21 RX ADMIN — Medication 100 MILLIGRAM(S): at 06:07

## 2023-12-21 RX ADMIN — Medication 4 MILLIGRAM(S): at 00:01

## 2023-12-21 RX ADMIN — OXYCODONE HYDROCHLORIDE 5 MILLIGRAM(S): 5 TABLET ORAL at 18:24

## 2023-12-21 RX ADMIN — OXYCODONE HYDROCHLORIDE 10 MILLIGRAM(S): 5 TABLET ORAL at 06:08

## 2023-12-21 RX ADMIN — OXYCODONE HYDROCHLORIDE 10 MILLIGRAM(S): 5 TABLET ORAL at 07:08

## 2023-12-21 RX ADMIN — Medication 650 MILLIGRAM(S): at 08:02

## 2023-12-21 RX ADMIN — OXYCODONE HYDROCHLORIDE 10 MILLIGRAM(S): 5 TABLET ORAL at 01:35

## 2023-12-21 RX ADMIN — OXYCODONE HYDROCHLORIDE 10 MILLIGRAM(S): 5 TABLET ORAL at 11:15

## 2023-12-21 RX ADMIN — OXYCODONE HYDROCHLORIDE 10 MILLIGRAM(S): 5 TABLET ORAL at 01:05

## 2023-12-21 RX ADMIN — CHLORHEXIDINE GLUCONATE 1 APPLICATION(S): 213 SOLUTION TOPICAL at 11:16

## 2023-12-21 RX ADMIN — LEVETIRACETAM 500 MILLIGRAM(S): 250 TABLET, FILM COATED ORAL at 06:08

## 2023-12-21 RX ADMIN — Medication 4 MILLIGRAM(S): at 23:58

## 2023-12-21 RX ADMIN — OXYCODONE HYDROCHLORIDE 5 MILLIGRAM(S): 5 TABLET ORAL at 23:09

## 2023-12-21 RX ADMIN — Medication 4 MILLIGRAM(S): at 11:16

## 2023-12-21 RX ADMIN — OXYCODONE HYDROCHLORIDE 10 MILLIGRAM(S): 5 TABLET ORAL at 15:17

## 2023-12-21 NOTE — BH CONSULTATION LIAISON ASSESSMENT NOTE - NSBHCHARTREVIEWINVESTIGATE_PSY_A_CORE FT
< from: 12 Lead ECG (05.01.23 @ 11:56) >      Ventricular Rate 77 BPM    Atrial Rate 77 BPM    P-R Interval 124 ms    QRS Duration 102 ms    Q-T Interval 360 ms    QTC Calculation(Bazett) 407 ms    P Axis 74 degrees    R Axis 88 degrees    T Axis 72 degrees    Diagnosis Line Normal sinus rhythm  Normal ECG    Confirmed by GEOFFREY PETERSON ([id number]) on 5/1/2023 9:23:52 PM    < end of copied text >

## 2023-12-21 NOTE — PHYSICAL THERAPY INITIAL EVALUATION ADULT - PRECAUTIONS/LIMITATIONS, REHAB EVAL
fall precautions no use of straws, do not blow nose, do not use incentive spirometer, no excessive bending forward, no COVID nasal swabs/fall precautions

## 2023-12-21 NOTE — BH CONSULTATION LIAISON ASSESSMENT NOTE - NSBHREFERDETAILS_PSY_A_CORE_FT
Consult called to assess pt who endorses SI (no plans) and appears to be severely depressed Consult called to assess pt who endorses SI (no plans) and appears to be depressed

## 2023-12-21 NOTE — BH CONSULTATION LIAISON ASSESSMENT NOTE - NSBHCHARTREVIEWVS_PSY_A_CORE FT
Vital Signs Last 24 Hrs  T(C): 37 (21 Dec 2023 07:00), Max: 37.1 (20 Dec 2023 15:30)  T(F): 98.6 (21 Dec 2023 07:00), Max: 98.8 (20 Dec 2023 15:30)  HR: 78 (21 Dec 2023 08:00) (77 - 120)  BP: 124/77 (20 Dec 2023 16:00) (119/73 - 126/77)  BP(mean): 94 (20 Dec 2023 16:00) (88 - 95)  RR: 16 (21 Dec 2023 08:00) (11 - 24)  SpO2: 98% (21 Dec 2023 08:00) (97% - 100%)    Parameters below as of 21 Dec 2023 07:00  Patient On (Oxygen Delivery Method): room air

## 2023-12-21 NOTE — BH CONSULTATION LIAISON ASSESSMENT NOTE - RISK ASSESSMENT
Pt denies acute suicidality but has multiple psychosocial stressors, endorses depressive sxs, and has h/o anxiety (did not see a psychiatrist). He is therefore not deemed to be at an acute risk for himself or others, but carries increased chronic suicide risk. Pt denies acute suicidality but has multiple psychosocial stressors, endorses depressive sxs, and has h/o anxiety (did not see a psychiatrist). He is therefore not deemed to be at an acute risk for himself or others, but carries slightly increased chronic suicide risk.

## 2023-12-21 NOTE — BH CONSULTATION LIAISON ASSESSMENT NOTE - CURRENT MEDICATION
MEDICATIONS  (STANDING):  chlorhexidine 4% Liquid 1 Application(s) Topical daily  dexAMETHasone  Injectable 4 milliGRAM(s) IV Push every 6 hours  influenza   Vaccine 0.5 milliLiter(s) IntraMuscular once  levETIRAcetam 500 milliGRAM(s) Oral two times a day  pantoprazole    Tablet 40 milliGRAM(s) Oral before breakfast  polyethylene glycol 3350 17 Gram(s) Oral daily  senna 1 Tablet(s) Oral at bedtime    MEDICATIONS  (PRN):  acetaminophen     Tablet .. 650 milliGRAM(s) Oral every 6 hours PRN Mild Pain (1 - 3)  oxyCODONE    IR 5 milliGRAM(s) Oral every 4 hours PRN Moderate Pain (4 - 6)  oxyCODONE    IR 10 milliGRAM(s) Oral every 4 hours PRN Severe Pain (7 - 10)

## 2023-12-21 NOTE — EEG REPORT - NS EEG TEXT BOX
DEEPIKA LINCOLN [id number]     Study Date: 	12-20-23 1727 - 0800 12-21-23  Duration in hours:  x 14hrs    --------------------------------------------------------------------------------------------------  History:  CC/ HPI Patient is a 38y old  Male who presents with a chief complaint of postop obvs (21 Dec 2023 02:50)    MEDICATIONS  (STANDING):  chlorhexidine 4% Liquid 1 Application(s) Topical daily  dexAMETHasone  Injectable 4 milliGRAM(s) IV Push every 6 hours  influenza   Vaccine 0.5 milliLiter(s) IntraMuscular once  levETIRAcetam 500 milliGRAM(s) Oral two times a day  pantoprazole    Tablet 40 milliGRAM(s) Oral before breakfast  polyethylene glycol 3350 17 Gram(s) Oral daily  senna 1 Tablet(s) Oral at bedtime    --------------------------------------------------------------------------------------------------  Study Interpretation:    [Abbreviation Key:  PDR=alpha rhythm/posterior dominant rhythm. A-P=anterior posterior.  Amplitude: ‘very low’:<20; ‘low’:20-49; ‘medium’:; ‘high’:>150uV.  Persistence for periodic/rhythmic patterns (% of epoch) ‘rare’:<1%; ‘occasional’:1-10%; ‘frequent’:10-50%; ‘abundant’:50-90%; ‘continuous’:>90%.  Persistence for sporadic discharges: ‘rare’:<1/hr; ‘occasional’:1/min-1/hr; ‘frequent’:>1/min; ‘abundant’:>1/10 sec.  RPP=rhythmic and periodic patterns; GRDA=generalized rhythmic delta activity; FIRDA=frontal intermittent GRDA; LRDA=lateralized rhythmic delta activity; TIRDA=temporal intermittent rhythmic delta activity;  LPD=PLED=lateralized periodic discharges; GPD=generalized periodic discharges; BIPDs =bilateral independent periodic discharges; Mf=multifocal; SIRPDs=stimulus induced rhythmic, periodic, or ictal appearing discharges; BIRDs=brief potentially ictal rhythmic discharges >4 Hz, lasting .5-10s; PFA (paroxysmal bursts >13 Hz or =8 Hz <10s).  Modifiers: +F=with fast component; +S=with spike component; +R=with rhythmic component.  S-B=burst suppression pattern.  Max=maximal. N1-drowsy; N2-stage II sleep; N3-slow wave sleep. SSS/BETS=small sharp spikes/benign epileptiform transients of sleep. HV=hyperventilation; PS=photic stimulation]    FINDINGS:      Background:  Continuity: continuous  Symmetry: symmetric  PDR: 9 Hz activity, with amplitude to 40 uV, that attenuated to eye opening.    Reactivity: present  Voltage: normal (between 20-150uV)  Anterior Posterior Gradient: present  Other background findings: none  Breach: absent    Background Slowing:  Generalized slowing: intermittent irregular delta and theta activity.  Focal slowing: frequent right hemispheric irregular delta/theta activity was present, more prominent frontocentrally, at times sharply contoured.    State Changes:   -Drowsiness noted with increased slowing, attenuation of fast activity.  -Present with N2 sleep transients with symmetric spindles and asymmetric K-complexes.    Sporadic Epileptiform Discharges:    None    Rhythmic and Periodic Patterns (RPPs):  None     Electrographic and Electroclinical seizures:  None    Other Clinical Events:  None    Activation Procedures:   -Hyperventilation was not performed.    -Photic stimulation was not performed.    Artifacts:  Intermittent myogenic and movement artifacts were noted.    ECG:  The heart rate on single channel ECG was predominantly between 70-80 BPM.    EEG Classification / Summary:  Abnormal EEG study  Focal right hemispheric frontocentral predominant slowing  Mild generalized background slowing    -----------------------------------------------------------------------------------------------------    Clinical Impression:  Focal right hemispheric frontocentral predominant cerebral dysfunction  Mild diffuse/multi-focal cerebral dysfunction, not specific as to etiology.  There were no epileptiform abnormalities or seizures recorded.    This is a preliminary report pending attending review and attestation.    Isiah Acuña MD  Fellow, [facility]    -------------------------------------------------------------------------------------------------------  [facility] EEG Reading Room Ph#: [phone]  Epilepsy Answering Service after 5PM and before 8:30AM: Ph#: [phone]   DEEPIKA LINCOLN [id number]     Study Date: 	12-20-23 1727 - 0800 12-21-23  Duration in hours:  x 14hrs    --------------------------------------------------------------------------------------------------  History:  CC/ HPI Patient is a 38y old  Male who presents with a chief complaint of postop obvs (21 Dec 2023 02:50)    MEDICATIONS  (STANDING):  chlorhexidine 4% Liquid 1 Application(s) Topical daily  dexAMETHasone  Injectable 4 milliGRAM(s) IV Push every 6 hours  influenza   Vaccine 0.5 milliLiter(s) IntraMuscular once  levETIRAcetam 500 milliGRAM(s) Oral two times a day  pantoprazole    Tablet 40 milliGRAM(s) Oral before breakfast  polyethylene glycol 3350 17 Gram(s) Oral daily  senna 1 Tablet(s) Oral at bedtime    --------------------------------------------------------------------------------------------------  Study Interpretation:    [Abbreviation Key:  PDR=alpha rhythm/posterior dominant rhythm. A-P=anterior posterior.  Amplitude: ‘very low’:<20; ‘low’:20-49; ‘medium’:; ‘high’:>150uV.  Persistence for periodic/rhythmic patterns (% of epoch) ‘rare’:<1%; ‘occasional’:1-10%; ‘frequent’:10-50%; ‘abundant’:50-90%; ‘continuous’:>90%.  Persistence for sporadic discharges: ‘rare’:<1/hr; ‘occasional’:1/min-1/hr; ‘frequent’:>1/min; ‘abundant’:>1/10 sec.  RPP=rhythmic and periodic patterns; GRDA=generalized rhythmic delta activity; FIRDA=frontal intermittent GRDA; LRDA=lateralized rhythmic delta activity; TIRDA=temporal intermittent rhythmic delta activity;  LPD=PLED=lateralized periodic discharges; GPD=generalized periodic discharges; BIPDs =bilateral independent periodic discharges; Mf=multifocal; SIRPDs=stimulus induced rhythmic, periodic, or ictal appearing discharges; BIRDs=brief potentially ictal rhythmic discharges >4 Hz, lasting .5-10s; PFA (paroxysmal bursts >13 Hz or =8 Hz <10s).  Modifiers: +F=with fast component; +S=with spike component; +R=with rhythmic component.  S-B=burst suppression pattern.  Max=maximal. N1-drowsy; N2-stage II sleep; N3-slow wave sleep. SSS/BETS=small sharp spikes/benign epileptiform transients of sleep. HV=hyperventilation; PS=photic stimulation]    FINDINGS:      Background:  Continuity: continuous  Symmetry: symmetric  PDR: 9 Hz activity, with amplitude to 40 uV, that attenuated to eye opening.    Reactivity: present  Voltage: normal (between 20-150uV)  Anterior Posterior Gradient: present  Other background findings: none  Breach: absent    Background Slowing:  Generalized slowing: intermittent irregular delta and theta activity.  Focal slowing: frequent right hemispheric irregular delta/theta activity was present, more prominent frontocentrally, at times sharply contoured.    State Changes:   -Drowsiness noted with increased slowing, attenuation of fast activity.  -Present with N2 sleep transients with symmetric spindles and asymmetric K-complexes.    Sporadic Epileptiform Discharges:    None    Rhythmic and Periodic Patterns (RPPs):  None     Electrographic and Electroclinical seizures:  None    Other Clinical Events:  None    Activation Procedures:   -Hyperventilation was not performed.    -Photic stimulation was not performed.    Artifacts:  Intermittent myogenic and movement artifacts were noted.    ECG:  The heart rate on single channel ECG was predominantly between 70-80 BPM.    EEG Classification / Summary:  Abnormal EEG study  Focal right hemispheric frontocentral predominant slowing  Mild generalized background slowing    -----------------------------------------------------------------------------------------------------    Clinical Impression:  Focal right hemispheric frontocentral predominant cerebral dysfunction  Mild diffuse/multi-focal cerebral dysfunction, not specific as to etiology.  There were no epileptiform abnormalities or seizures recorded.    This is a preliminary report pending attending review and attestation.    Isiah Acuña MD  Fellow, [facility]    -------------------------------------------------------------------------------------------------------  [facility] EEG Reading Room Ph#: [phone]  Epilepsy Answering Service after 5PM and before 8:30AM: Ph#: [phone]   DEEPIKA LINCOLN [id number]     Study Date: 	12-20-23 1727 - 0800 12-21-23  Duration in hours:  x 14hrs    --------------------------------------------------------------------------------------------------  History:  CC/ HPI Patient is a 38y old  Male who presents with a chief complaint of postop obvs (21 Dec 2023 02:50)    MEDICATIONS  (STANDING):  chlorhexidine 4% Liquid 1 Application(s) Topical daily  dexAMETHasone  Injectable 4 milliGRAM(s) IV Push every 6 hours  influenza   Vaccine 0.5 milliLiter(s) IntraMuscular once  levETIRAcetam 500 milliGRAM(s) Oral two times a day  pantoprazole    Tablet 40 milliGRAM(s) Oral before breakfast  polyethylene glycol 3350 17 Gram(s) Oral daily  senna 1 Tablet(s) Oral at bedtime    --------------------------------------------------------------------------------------------------  Study Interpretation:    [Abbreviation Key:  PDR=alpha rhythm/posterior dominant rhythm. A-P=anterior posterior.  Amplitude: ‘very low’:<20; ‘low’:20-49; ‘medium’:; ‘high’:>150uV.  Persistence for periodic/rhythmic patterns (% of epoch) ‘rare’:<1%; ‘occasional’:1-10%; ‘frequent’:10-50%; ‘abundant’:50-90%; ‘continuous’:>90%.  Persistence for sporadic discharges: ‘rare’:<1/hr; ‘occasional’:1/min-1/hr; ‘frequent’:>1/min; ‘abundant’:>1/10 sec.  RPP=rhythmic and periodic patterns; GRDA=generalized rhythmic delta activity; FIRDA=frontal intermittent GRDA; LRDA=lateralized rhythmic delta activity; TIRDA=temporal intermittent rhythmic delta activity;  LPD=PLED=lateralized periodic discharges; GPD=generalized periodic discharges; BIPDs =bilateral independent periodic discharges; Mf=multifocal; SIRPDs=stimulus induced rhythmic, periodic, or ictal appearing discharges; BIRDs=brief potentially ictal rhythmic discharges >4 Hz, lasting .5-10s; PFA (paroxysmal bursts >13 Hz or =8 Hz <10s).  Modifiers: +F=with fast component; +S=with spike component; +R=with rhythmic component.  S-B=burst suppression pattern.  Max=maximal. N1-drowsy; N2-stage II sleep; N3-slow wave sleep. SSS/BETS=small sharp spikes/benign epileptiform transients of sleep. HV=hyperventilation; PS=photic stimulation]    FINDINGS:      Background:  Continuity: continuous  Symmetry: symmetric  PDR: 9 Hz activity, with amplitude to 40 uV, that attenuated to eye opening.    Reactivity: present  Voltage: normal (between 20-150uV)  Anterior Posterior Gradient: present  Other background findings: none  Breach: absent    Background Slowing:  Generalized slowing: intermittent irregular delta and theta activity.  Focal slowing: frequent right hemispheric irregular delta/theta activity was present, more prominent frontocentrally, at times sharply contoured.    State Changes:   -Drowsiness noted with increased slowing, attenuation of fast activity.  -Present with N2 sleep transients with symmetric spindles and asymmetric K-complexes.    Sporadic Epileptiform Discharges:    None    Rhythmic and Periodic Patterns (RPPs):  None     Electrographic and Electroclinical seizures:  None    Other Clinical Events:  None    Activation Procedures:   -Hyperventilation was not performed.    -Photic stimulation was not performed.    Artifacts:  Intermittent myogenic and movement artifacts were noted.    ECG:  The heart rate on single channel ECG was predominantly between 70-80 BPM.    EEG Classification / Summary:  Abnormal EEG study  Focal right hemispheric frontocentral predominant slowing  Mild generalized background slowing    -----------------------------------------------------------------------------------------------------    Clinical Impression:  Focal right hemispheric frontocentral predominant cerebral dysfunction  Mild diffuse/multi-focal cerebral dysfunction, not specific as to etiology.  There were no epileptiform abnormalities or seizures recorded.      Isiah Acuña MD  Fellow, [facility]    Derik Alvarez MD  EEG/Epilepsy Attending     -------------------------------------------------------------------------------------------------------  [facility] EEG Reading Room Ph#: [phone]  Epilepsy Answering Service after 5PM and before 8:30AM: Ph#: [phone]   DEEPIKA LINCOLN [id number]     Study Date: 	12-20-23 1727 - 0800 12-21-23  Duration in hours:  x 14hrs    --------------------------------------------------------------------------------------------------  History:  CC/ HPI Patient is a 38y old  Male who presents with a chief complaint of postop obvs (21 Dec 2023 02:50)    MEDICATIONS  (STANDING):  chlorhexidine 4% Liquid 1 Application(s) Topical daily  dexAMETHasone  Injectable 4 milliGRAM(s) IV Push every 6 hours  influenza   Vaccine 0.5 milliLiter(s) IntraMuscular once  levETIRAcetam 500 milliGRAM(s) Oral two times a day  pantoprazole    Tablet 40 milliGRAM(s) Oral before breakfast  polyethylene glycol 3350 17 Gram(s) Oral daily  senna 1 Tablet(s) Oral at bedtime    --------------------------------------------------------------------------------------------------  Study Interpretation:    [Abbreviation Key:  PDR=alpha rhythm/posterior dominant rhythm. A-P=anterior posterior.  Amplitude: ‘very low’:<20; ‘low’:20-49; ‘medium’:; ‘high’:>150uV.  Persistence for periodic/rhythmic patterns (% of epoch) ‘rare’:<1%; ‘occasional’:1-10%; ‘frequent’:10-50%; ‘abundant’:50-90%; ‘continuous’:>90%.  Persistence for sporadic discharges: ‘rare’:<1/hr; ‘occasional’:1/min-1/hr; ‘frequent’:>1/min; ‘abundant’:>1/10 sec.  RPP=rhythmic and periodic patterns; GRDA=generalized rhythmic delta activity; FIRDA=frontal intermittent GRDA; LRDA=lateralized rhythmic delta activity; TIRDA=temporal intermittent rhythmic delta activity;  LPD=PLED=lateralized periodic discharges; GPD=generalized periodic discharges; BIPDs =bilateral independent periodic discharges; Mf=multifocal; SIRPDs=stimulus induced rhythmic, periodic, or ictal appearing discharges; BIRDs=brief potentially ictal rhythmic discharges >4 Hz, lasting .5-10s; PFA (paroxysmal bursts >13 Hz or =8 Hz <10s).  Modifiers: +F=with fast component; +S=with spike component; +R=with rhythmic component.  S-B=burst suppression pattern.  Max=maximal. N1-drowsy; N2-stage II sleep; N3-slow wave sleep. SSS/BETS=small sharp spikes/benign epileptiform transients of sleep. HV=hyperventilation; PS=photic stimulation]    FINDINGS:      Background:  Continuity: continuous  Symmetry: symmetric  PDR: 9 Hz activity, with amplitude to 40 uV, that attenuated to eye opening.    Reactivity: present  Voltage: normal (between 20-150uV)  Anterior Posterior Gradient: present  Other background findings: none  Breach: absent    Background Slowing:  Generalized slowing: intermittent irregular delta and theta activity.  Focal slowing: frequent right hemispheric irregular delta/theta activity was present, more prominent frontocentrally, at times sharply contoured.    State Changes:   -Drowsiness noted with increased slowing, attenuation of fast activity.  -Present with N2 sleep transients with symmetric spindles and asymmetric K-complexes.    Sporadic Epileptiform Discharges:    None    Rhythmic and Periodic Patterns (RPPs):  None     Electrographic and Electroclinical seizures:  None    Other Clinical Events:  None    Activation Procedures:   -Hyperventilation was not performed.    -Photic stimulation was not performed.    Artifacts:  Intermittent myogenic and movement artifacts were noted.    ECG:  The heart rate on single channel ECG was predominantly between 70-80 BPM.    EEG Classification / Summary:  Abnormal EEG study  Focal right hemispheric frontocentral predominant slowing  Mild generalized background slowing    -----------------------------------------------------------------------------------------------------    Clinical Impression:  Focal right hemispheric frontocentral predominant cerebral dysfunction  Mild diffuse/multi-focal cerebral dysfunction, not specific as to etiology.  There were no epileptiform abnormalities or seizures recorded.      Isiah Acuña MD  Fellow, [facility]    Derik Alvarez MD  EEG/Epilepsy Attending     -------------------------------------------------------------------------------------------------------  [facility] EEG Reading Room Ph#: [phone]  Epilepsy Answering Service after 5PM and before 8:30AM: Ph#: [phone]   DEEPIKA LINCOLN [id number]     Study Date: 	12-20-23 6427 - 1400 12-21-23  Duration in hours:  x 20hrs    --------------------------------------------------------------------------------------------------  History:  CC/ HPI Patient is a 38y old  Male who presents with a chief complaint of postop obvs (21 Dec 2023 02:50)    MEDICATIONS  (STANDING):  chlorhexidine 4% Liquid 1 Application(s) Topical daily  dexAMETHasone  Injectable 4 milliGRAM(s) IV Push every 6 hours  influenza   Vaccine 0.5 milliLiter(s) IntraMuscular once  levETIRAcetam 500 milliGRAM(s) Oral two times a day  pantoprazole    Tablet 40 milliGRAM(s) Oral before breakfast  polyethylene glycol 3350 17 Gram(s) Oral daily  senna 1 Tablet(s) Oral at bedtime    --------------------------------------------------------------------------------------------------  Study Interpretation:    [Abbreviation Key:  PDR=alpha rhythm/posterior dominant rhythm. A-P=anterior posterior.  Amplitude: ‘very low’:<20; ‘low’:20-49; ‘medium’:; ‘high’:>150uV.  Persistence for periodic/rhythmic patterns (% of epoch) ‘rare’:<1%; ‘occasional’:1-10%; ‘frequent’:10-50%; ‘abundant’:50-90%; ‘continuous’:>90%.  Persistence for sporadic discharges: ‘rare’:<1/hr; ‘occasional’:1/min-1/hr; ‘frequent’:>1/min; ‘abundant’:>1/10 sec.  RPP=rhythmic and periodic patterns; GRDA=generalized rhythmic delta activity; FIRDA=frontal intermittent GRDA; LRDA=lateralized rhythmic delta activity; TIRDA=temporal intermittent rhythmic delta activity;  LPD=PLED=lateralized periodic discharges; GPD=generalized periodic discharges; BIPDs =bilateral independent periodic discharges; Mf=multifocal; SIRPDs=stimulus induced rhythmic, periodic, or ictal appearing discharges; BIRDs=brief potentially ictal rhythmic discharges >4 Hz, lasting .5-10s; PFA (paroxysmal bursts >13 Hz or =8 Hz <10s).  Modifiers: +F=with fast component; +S=with spike component; +R=with rhythmic component.  S-B=burst suppression pattern.  Max=maximal. N1-drowsy; N2-stage II sleep; N3-slow wave sleep. SSS/BETS=small sharp spikes/benign epileptiform transients of sleep. HV=hyperventilation; PS=photic stimulation]    FINDINGS:      Background:  Continuity: continuous  Symmetry: symmetric  PDR: 9 Hz activity, with amplitude to 40 uV, that attenuated to eye opening.    Reactivity: present  Voltage: normal (between 20-150uV)  Anterior Posterior Gradient: present  Other background findings: none  Breach: absent    Background Slowing:  Generalized slowing: intermittent irregular delta and theta activity.  Focal slowing: frequent right hemispheric irregular delta/theta activity was present, more prominent frontocentrally, at times sharply contoured.    State Changes:   -Drowsiness noted with increased slowing, attenuation of fast activity.  -Present with N2 sleep transients with symmetric spindles and asymmetric K-complexes.    Sporadic Epileptiform Discharges:    None    Rhythmic and Periodic Patterns (RPPs):  None     Electrographic and Electroclinical seizures:  None    Other Clinical Events:  None    Activation Procedures:   -Hyperventilation was not performed.    -Photic stimulation was not performed.    Artifacts:  Intermittent myogenic and movement artifacts were noted.    ECG:  The heart rate on single channel ECG was predominantly between 70-80 BPM.    EEG Classification / Summary:  Abnormal EEG study  Focal right hemispheric frontocentral predominant slowing  Mild generalized background slowing    -----------------------------------------------------------------------------------------------------    Clinical Impression:  Focal right hemispheric frontocentral predominant cerebral dysfunction  Mild diffuse/multi-focal cerebral dysfunction, not specific as to etiology.  There were no epileptiform abnormalities or seizures recorded.      Isiah Acuña MD  Fellow, [facility]    Derik Alvarez MD  EEG/Epilepsy Attending     -------------------------------------------------------------------------------------------------------  [facility] EEG Reading Room Ph#: [phone]  Epilepsy Answering Service after 5PM and before 8:30AM: Ph#: [phone]   DEEPIKA LINCOLN [id number]     Study Date: 	12-20-23 6697 - 1400 12-21-23  Duration in hours:  x 20hrs    --------------------------------------------------------------------------------------------------  History:  CC/ HPI Patient is a 38y old  Male who presents with a chief complaint of postop obvs (21 Dec 2023 02:50)    MEDICATIONS  (STANDING):  chlorhexidine 4% Liquid 1 Application(s) Topical daily  dexAMETHasone  Injectable 4 milliGRAM(s) IV Push every 6 hours  influenza   Vaccine 0.5 milliLiter(s) IntraMuscular once  levETIRAcetam 500 milliGRAM(s) Oral two times a day  pantoprazole    Tablet 40 milliGRAM(s) Oral before breakfast  polyethylene glycol 3350 17 Gram(s) Oral daily  senna 1 Tablet(s) Oral at bedtime    --------------------------------------------------------------------------------------------------  Study Interpretation:    [Abbreviation Key:  PDR=alpha rhythm/posterior dominant rhythm. A-P=anterior posterior.  Amplitude: ‘very low’:<20; ‘low’:20-49; ‘medium’:; ‘high’:>150uV.  Persistence for periodic/rhythmic patterns (% of epoch) ‘rare’:<1%; ‘occasional’:1-10%; ‘frequent’:10-50%; ‘abundant’:50-90%; ‘continuous’:>90%.  Persistence for sporadic discharges: ‘rare’:<1/hr; ‘occasional’:1/min-1/hr; ‘frequent’:>1/min; ‘abundant’:>1/10 sec.  RPP=rhythmic and periodic patterns; GRDA=generalized rhythmic delta activity; FIRDA=frontal intermittent GRDA; LRDA=lateralized rhythmic delta activity; TIRDA=temporal intermittent rhythmic delta activity;  LPD=PLED=lateralized periodic discharges; GPD=generalized periodic discharges; BIPDs =bilateral independent periodic discharges; Mf=multifocal; SIRPDs=stimulus induced rhythmic, periodic, or ictal appearing discharges; BIRDs=brief potentially ictal rhythmic discharges >4 Hz, lasting .5-10s; PFA (paroxysmal bursts >13 Hz or =8 Hz <10s).  Modifiers: +F=with fast component; +S=with spike component; +R=with rhythmic component.  S-B=burst suppression pattern.  Max=maximal. N1-drowsy; N2-stage II sleep; N3-slow wave sleep. SSS/BETS=small sharp spikes/benign epileptiform transients of sleep. HV=hyperventilation; PS=photic stimulation]    FINDINGS:      Background:  Continuity: continuous  Symmetry: symmetric  PDR: 9 Hz activity, with amplitude to 40 uV, that attenuated to eye opening.    Reactivity: present  Voltage: normal (between 20-150uV)  Anterior Posterior Gradient: present  Other background findings: none  Breach: absent    Background Slowing:  Generalized slowing: intermittent irregular delta and theta activity.  Focal slowing: frequent right hemispheric irregular delta/theta activity was present, more prominent frontocentrally, at times sharply contoured.    State Changes:   -Drowsiness noted with increased slowing, attenuation of fast activity.  -Present with N2 sleep transients with symmetric spindles and asymmetric K-complexes.    Sporadic Epileptiform Discharges:    None    Rhythmic and Periodic Patterns (RPPs):  None     Electrographic and Electroclinical seizures:  None    Other Clinical Events:  None    Activation Procedures:   -Hyperventilation was not performed.    -Photic stimulation was not performed.    Artifacts:  Intermittent myogenic and movement artifacts were noted.    ECG:  The heart rate on single channel ECG was predominantly between 70-80 BPM.    EEG Classification / Summary:  Abnormal EEG study  Focal right hemispheric frontocentral predominant slowing  Mild generalized background slowing    -----------------------------------------------------------------------------------------------------    Clinical Impression:  Focal right hemispheric frontocentral predominant cerebral dysfunction  Mild diffuse/multi-focal cerebral dysfunction, not specific as to etiology.  There were no epileptiform abnormalities or seizures recorded.      Isiah Acuña MD  Fellow, [facility]    Derik Alvarez MD  EEG/Epilepsy Attending     -------------------------------------------------------------------------------------------------------  [facility] EEG Reading Room Ph#: [phone]  Epilepsy Answering Service after 5PM and before 8:30AM: Ph#: [phone]
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
## 2023-12-21 NOTE — BH CONSULTATION LIAISON ASSESSMENT NOTE - OTHER
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 Says he is being followed and watched by people when he leaves the house.

## 2023-12-21 NOTE — BH CONSULTATION LIAISON ASSESSMENT NOTE - NSBHATTESTCOMMENTATTENDFT_PSY_A_CORE
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 Dominguez for resection, presents for right inter hemispheric Transcallosal Approach For Craniotomy of Colloid Cyst on 12/20/23, psychiatry consulted to evaluate for SI. 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 Dominguez for resection, presents for right inter hemispheric Transcallosal Approach For Craniotomy of Colloid Cyst on 12/20/23, psychiatry consulted to evaluate for SI.  Pt reports several months of depression in s/o multiple health stressors, job loss, surgery, considering bankruptcy, and loss of a relationship.  States he has had poor sleep, has tried multiple meds which have not been helpful.  States he told staff "I might as well just die" in the hospital, did not mean it, denies active or passive SI, cites family as major protective factors.  Denies prior SA, substance use, or access to guns.  Collateral received from mother at bedside with pt's permission; confirms above hx, denies c/f paranoia, psychosis, SI/HI.  States pt seeing an online therapist.  Dx: MDD.  Recs: SW for OP psych referrals for antidepressant trial, Melatonin PRN insomnia.  Pt does not meet criteria for psychiatric admission at this time.  Agree with resident's assessment and plan as above.

## 2023-12-21 NOTE — OCCUPATIONAL THERAPY INITIAL EVALUATION ADULT - ADDITIONAL COMMENTS
pt reports lives in private home with mother, 1 steps to enter, no stairs inside, walk-in shower. Prior to admission Ind with ADLs/ambulating, no AD/DME. Mother able to assist as needed.

## 2023-12-21 NOTE — BH CONSULTATION LIAISON ASSESSMENT NOTE - NSBHMSETHTPROC_PSY_A_CORE
PC:  Wheezing, coughing    [place]:  neurologist at [facility] for Other (cough).   • lamoTRIgine (LaMICtal) 25 MG tablet Take 25 mg by mouth.   • Spacer/Aero Chamber Mouthpiece Misc To be used with inhalers.   • polyethylene glycol (MIRALAX) powder Take 17 g by mouth daily.   • Cholecalciferol (VITAMIN D) 2000 units capsule    • psyllium (METAMUCIL) 0.52 G capsule Take 1 capsule by mouth daily.   • Probiotic Product (PROBIOTIC DAILY PO) Take  by mouth.   • aspirin 81 MG tablet Take 81 mg by mouth daily.     No current facility-administered medications for this encounter.       O/E:  Visit Vitals  /82 (Patient Position: Sitting)   Pulse (!) 103   Resp 18   Ht 5' 2\" (1.575 m)   Wt 73.9 kg (163 lb)   SpO2 93%   BMI 29.81 kg/m²       Examination of the patient reveals:     GENERAL: alert, is in no apparent distress and is well developed and well nourished  NECK: neck is supple, no thyromegaly, no anterior cervical adenopathy, no posterior cervical adenopathy and no supraclavicular adenopathy  CHEST: contour is normal with normal AP diameter, normal respiratory excursion and respiratory effort is not labored  LUNGS: lungs are clear to auscultation with normal inspiratory/expiratory sounds, no rales, no rhonchi and no wheezes  HEART: normal PMI, normal rate and rhythm, no palpable heaves or thrills, S1 and S2 normal, no S3 or S4, no murmurs and no extra heart sounds  ABDOMEN: abdomen is soft, normal active bowel sounds, nontender, without masses, without hepatomegaly, without splenomegaly and no pulsatile masses  BACK: inspection shows no curvature, no discomfort to palpation of the midline and no costovertebral angle discomfort to palpation  NEUROLOGIC: cranial nerves 2 through 12 normal, motor strength normal, gait and station normal, coordination normal, DTR's normal and symmetric and no tremor noted  EXTREMITIES: no clubbing, no cyanosis and no edema    WBC [phone]               Linear

## 2023-12-21 NOTE — BH CONSULTATION LIAISON ASSESSMENT NOTE - NSBHCHARTREVIEWLAB_PSY_A_CORE FT
LABS:                        14.3   7.42  )-----------( 288      ( 20 Dec 2023 16:07 )             40.6     12-20    145  |  104  |  15  ----------------------------<  147<H>  4.4   |  26  |  1.05    Ca    9.2      20 Dec 2023 16:07  Phos  3.9     12-20  Mg     2.5     12-20      PT/INR - ( 20 Dec 2023 16:07 )   PT: 11.7 sec;   INR: 1.12 ratio         PTT - ( 20 Dec 2023 16:07 )  PTT:30.5 sec

## 2023-12-21 NOTE — OCCUPATIONAL THERAPY INITIAL EVALUATION ADULT - SPECIFY REASON(S)
NEPHROLOGY PROGRESS NOTE    Patient: Mamta Astorga               Sex: female          DOA: 6/19/2021  5:47 PM   YOB: 1943        Age:  68 y o         LOS:  LOS: 4 days       HPI     Patient with hyponatremia    SUBJECTIVE     Overall feeling better    Sodium seems to be stable    No drinking too much liquid    No chest pain no palpitation    Much more awake and alert    CURRENT MEDICATIONS       Current Facility-Administered Medications:   •  acetaminophen (TYLENOL) tablet 650 mg, 650 mg, Oral, Q6H PRN, Mora Suarez PA-C, 650 mg at 06/20/21 2248  •  albuterol (PROVENTIL HFA,VENTOLIN HFA) inhaler 2 puff, 2 puff, Inhalation, Q6H PRN, Mora Suarez PA-C, 2 puff at 06/20/21 2300  •  albuterol inhalation solution 2 5 mg, 2 5 mg, Nebulization, Q6H PRN, Mora Suarez PA-C, 2 5 mg at 06/21/21 0101  •  atorvastatin (LIPITOR) tablet 10 mg, 10 mg, Oral, Daily With Dinner, Mora Suarez PA-C, 10 mg at 06/24/21 1458  •  cefepime (MAXIPIME) 1,000 mg in dextrose 5 % 50 mL IVPB, 1,000 mg, Intravenous, Q12H, Letty Chairez PA-C, Last Rate: 100 mL/hr at 06/24/21 1027, 1,000 mg at 06/24/21 1027  •  furosemide (LASIX) tablet 20 mg, 20 mg, Oral, BID (diuretic), Travis Shah MD, 20 mg at 06/24/21 1458  •  heparin (porcine) subcutaneous injection 5,000 Units, 5,000 Units, Subcutaneous, Q8H [facility], Letty Chairez PA-C, 5,000 Units at 06/24/21 1458  •  montelukast (SINGULAIR) tablet 10 mg, 10 mg, Oral, HS, Letty Chairez PA-C, 10 mg at 06/23/21 2124  •  QUEtiapine (SEROquel) tablet 12 5 mg, 12 5 mg, Oral, HS, Anastasia Pacheco PA-C, 12 5 mg at 06/23/21 2124    OBJECTIVE     Current Weight: Weight - Scale: 89 3 kg (196 lb 13 9 oz)  Vitals:    06/24/21 0741   BP: 127/84   Pulse: 85   Resp: 18   Temp: 98 2 °F (36 8 °C)   SpO2: 95%       Intake/Output Summary (Last 24 hours) at 6/24/2021 1518  Last data filed at 6/24/2021 0800  Gross per 24 hour   Intake 420 ml   Output --   Net 420 ml       PHYSICAL EXAMINATION Physical Exam  Constitutional:       General: She is not in acute distress  Appearance: She is well-developed  HENT:      Head: Normocephalic  Eyes:      General: No scleral icterus  Conjunctiva/sclera: Conjunctivae normal    Neck:      Vascular: No JVD  Cardiovascular:      Rate and Rhythm: Normal rate  Heart sounds: Normal heart sounds  Pulmonary:      Effort: Pulmonary effort is normal       Breath sounds: No wheezing  Abdominal:      Palpations: Abdomen is soft  Tenderness: There is no abdominal tenderness  Musculoskeletal:         General: Normal range of motion  Cervical back: Neck supple  Skin:     General: Skin is warm  Findings: No rash  Neurological:      Mental Status: She is alert and oriented to person, place, and time  Psychiatric:         Behavior: Behavior normal           LAB RESULTS     Results from last 7 days   Lab Units 06/24/21  0753 06/24/21  0025 06/23/21 2056 06/23/21  1056 06/22/21  0613 06/21/21  2142 06/21/21  1249 06/21/21  0540 06/19/21  1922   WBC Thousand/uL  --   --   --   --  11 65*  --   --   --  6 70   HEMOGLOBIN g/dL  --   --   --   --  14 7  --   --   --  14 4   HEMATOCRIT %  --   --   --   --  45 1  --   --   --  44 3   PLATELETS Thousands/uL  --   --   --   --  302  --   --   --  174   POTASSIUM mmol/L 3 6 3 2* 3 2* 3 5  --  3 8 3 4* 3 3* 3 5   CHLORIDE mmol/L 87* 88* 89* 89*  --  84* 82* 81* 77*   CO2 mmol/L 41* 39* 39* 37*  --  35* 33* 34* 35*   BUN mg/dL 16 18 18 15  --  9 8 6 16   CREATININE mg/dL 0 71 0 68 0 72 0 69  --  0 53* 0 50* 0 50* 0 69   EGFR ml/min/1 73sq m 82 85 81 84  --  92 94 94 84   CALCIUM mg/dL 9 1 8 7 8 5 9 1  --  8 2* 8 1* 8 0* 8 8   MAGNESIUM mg/dL  --   --   --   --   --   --   --   --  2 0       RADIOLOGY RESULTS      Results for orders placed during the hospital encounter of 06/19/21    XR chest 1 view portable    Narrative  CHEST    INDICATION:   hypoxia      COMPARISON: Chest radiograph 2/8/2019 and chest CT from 6/19/2021  EXAM PERFORMED/VIEWS:  XR CHEST PORTABLE  FINDINGS:    Cardiomediastinal silhouette normal  Redemonstration of pulmonary artery enlargement  Lungs clear  No effusion or pneumothorax  Osseous structures normal for age  Impression  No acute cardiopulmonary disease  Redemonstration of pulmonary artery enlargement compatible with the history of pulmonary hypertension  Workstation performed: [id number]    Results for orders placed during the hospital encounter of 02/08/19    XR chest pa & lateral    Narrative  CHEST    INDICATION:   cough  COMPARISON:  1/18/2017    EXAM PERFORMED/VIEWS:  XR CHEST PA & LATERAL      FINDINGS:    Cardiomediastinal silhouette appears unremarkable  The lungs are clear  Stable elevation right hemidiaphragm  No pneumothorax or pleural effusion  Osseous structures appear within normal limits for patient age  Impression  No acute cardiopulmonary disease  Workstation performed: [id number]    No results found for this or any previous visit  No results found for this or any previous visit  No results found for this or any previous visit  No results found for this or any previous visit  PLAN / RECOMMENDATIONS      Hyponatremia:  Seems to be better and stable at 131  Will continue treat with fluid restriction  Will add salt tablet as part of the management    Altered mental status:  Much better    Diabetes type 2 glucose much better  She was admitted hyperglycemia    Disposition:  Patient discharged to rehab of of    Mariana Solano MD  Nephrology  6/24/2021        Portions of the record may have been created with voice recognition software  Occasional wrong word or "sound a like" substitutions may have occurred due to the inherent limitations of voice recognition software  Read the chart carefully and recognize, using context, where substitutions have occurred  independent with ADLs and functional transfers
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
## 2023-12-21 NOTE — PROGRESS NOTE ADULT - ASSESSMENT
ASSESSMENT/PLAN:   POD 0 - craniotomy for resection of colloid cyst     NEURO:  CT Head in AM  MRI post-op  tylenol and oxycodone PRN   Activity: [] OOB as tolerated [] Bedrest [] PT [] OT [] PMNR    PULM:  Incentive spirometry, mobilize as tolerated    CV:  -160mmHg    RENAL:  IVF until good PO intake  d/c meade in AM    GI:  Diet: Dysphagia screen and then advance diet as tolerated  sennna and miralax for bowel regimen  Last Bowel Movement:   GI prophylaxis [] not indicated [] PPI [] other:    ENDO:   Goal euglycemia (-180)    HEME/ONC:  VTE prophylaxis: [X] SCDs [] chemoprophylaxis [] hold chemoprophylaxis due to: [] high risk of DVT/PE on admission due to:    ID:  Hortensia-op antibiotics ASSESSMENT/PLAN:   POD 0 - craniotomy for resection of colloid cyst     NEURO:  CT Head in AM  MRI post-op  Follow up with psych for MDD  tylenol and oxycodone PRN   Activity: [] OOB as tolerated [] Bedrest [] PT [] OT [] PMNR    PULM:  Incentive spirometry, mobilize as tolerated    CV:  -160mmHg    RENAL:  IVF until good PO intake  d/c meade in AM    GI:  Diet: Dysphagia screen and then advance diet as tolerated  sennna and miralax for bowel regimen  Last Bowel Movement:   GI prophylaxis [] not indicated [] PPI [] other:    ENDO:   Goal euglycemia (-180)    HEME/ONC:  VTE prophylaxis: [X] SCDs [] chemoprophylaxis [] hold chemoprophylaxis due to: [] high risk of DVT/PE on admission due to:    ID:  Hortensia-op antibiotics

## 2023-12-21 NOTE — PROGRESS NOTE ADULT - SUBJECTIVE AND OBJECTIVE BOX
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 Chinchilla on 5/8/2023. Since then he has chief complaint of sudden dropping/fainting episodes. It is so severe it has impacted his quality of life poorly. These episodes are spontaneous, non provoked and can occur multiple times per day. He has a known third ventricle colloid cyst and consulted with Dr. Dominguez for resection. He presents to Presurgical Testing for evaluation prior to Right Inter Hemispheric Transcallosal Approach For Craniotomy of Colloid Cyst on 11/22/23.     HOSPITAL COURSE:  12/20- POD0 resection of colloid cyst  12/21- ambulate and advance diet as tolerated. No evidence of seizures on EEG      Allergies    No Known Allergies    Intolerances      REVIEW OF SYSTEMS: [ ] Unable to Assess due to neurologic exam   [X] All ROS addressed below are non-contributory, except:  Neuro: [ ] Headache [ ] Back pain [ ] Numbness [ ] Weakness [ ] Ataxia [ ] Dizziness [ ] Aphasia [ ] Dysarthria [ ] Visual disturbance  Resp: [ ] Shortness of breath/dyspnea, [ ] Orthopnea [ ] Cough  CV: [ ] Chest pain [ ] Palpitation [ ] Lightheadedness [ ] Syncope  Renal: [ ] Thirst [ ] Edema  GI: [ ] Nausea [ ] Emesis [ ] Abdominal pain [ ] Constipation [ ] Diarrhea  Hem: [ ] Hematemesis [ ] bright red blood per rectum  ID: [ ] Fever [ ] Chills [ ] Dysuria  ENT: [ ] Rhinorrhea      DEVICES:   [ ] Restraints [ ] ET tube [ ] central line [ ] arterial line [ ] meade [ ] NGT/OGT [ ] EVD [ ] LD [ ] CARY/HMV [ ] Trach [ ] PEG [ ] Chest Tube     VITALS:   ICU Vital Signs Last 24 Hrs  T(C): 36.8 (21 Dec 2023 03:00), Max: 37.1 (20 Dec 2023 15:30)  T(F): 98.3 (21 Dec 2023 03:00), Max: 98.8 (20 Dec 2023 15:30)  HR: 78 (21 Dec 2023 06:00) (77 - 120)  BP: 124/77 (20 Dec 2023 16:00) (119/73 - 127/79)  BP(mean): 94 (20 Dec 2023 16:00) (88 - 95)  ABP: 110/66 (21 Dec 2023 06:00) (105/65 - 333/327)  ABP(mean): 84 (21 Dec 2023 06:00) (82 - 330)  RR: 15 (21 Dec 2023 06:00) (11 - 24)  SpO2: 100% (21 Dec 2023 06:00) (97% - 100%)    O2 Parameters below as of 20 Dec 2023 16:25  Patient On (Oxygen Delivery Method): room air      I&O's Summary    20 Dec 2023 07:01  -  21 Dec 2023 07:00  --------------------------------------------------------  IN: 1025 mL / OUT: 1800 mL / NET: -775 mL      LABS:                        14.3   7.42  )-----------( 288      ( 20 Dec 2023 16:07 )             40.6   12-20    145  |  104  |  15  ----------------------------<  147<H>  4.4   |  26  |  1.05           MEDICATION LEVELS:   MEDICATIONS  (STANDING):  chlorhexidine 4% Liquid 1 Application(s) Topical daily  dexAMETHasone  Injectable 4 milliGRAM(s) IV Push every 6 hours  influenza   Vaccine 0.5 milliLiter(s) IntraMuscular once  levETIRAcetam 500 milliGRAM(s) Oral two times a day  pantoprazole    Tablet 40 milliGRAM(s) Oral before breakfast  polyethylene glycol 3350 17 Gram(s) Oral daily  senna 1 Tablet(s) Oral at bedtime    MEDICATIONS  (PRN):  acetaminophen     Tablet .. 650 milliGRAM(s) Oral every 6 hours PRN Mild Pain (1 - 3)  oxyCODONE    IR 5 milliGRAM(s) Oral every 4 hours PRN Moderate Pain (4 - 6)  oxyCODONE    IR 10 milliGRAM(s) Oral every 4 hours PRN Severe Pain (7 - 10)    EXAMINATION:  PHYSICAL EXAM:    Constitutional: No Acute Distress     Neurological: Awake, alert oriented to person, place and time, Following Commands, PERRL, EOMI, No Gaze Preference, Face Symmetrical, Speech Fluent, No dysmetria, No ataxia, No nystagmus     Motor exam:          Upper extremity                         Delt     Bicep     Tricep    HG                                                 R         5/5 5/5 5/5 5/5                                               L          5/5 5/5 5/5 5/5          Lower extremity                        HF         KF        KE       DF         PF                                                  R        5/5 5/5 5/5 5/5 5/5                                               L         5/5 5/5 5/5 5/5 5/5                                                 Sensation: [ ] intact to light touch  [ ] decreased:     Reflexes: Deep Tendon Reflexes Intact     Pulmonary: Clear to Auscultation, No rales, No rhonchi, No wheezes     Cardiovascular: S1, S2, Regular rate and rhythm     Gastrointestinal: Soft, Non-tender, Non-distended     Extremities: No calf tenderness     Incision: dry and clean   37 yo M with a PMH of Rathke's cleft cyst s/p endoscopic endonasal transphenoidal Rathke cyst drainage and septoplasty by Dr. Chinchilla on 5/8/2023. Since then he has chief complaint of sudden dropping/fainting episodes. It is so severe it has impacted his quality of life poorly. These episodes are spontaneous, non provoked and can occur multiple times per day. He has a known third ventricle colloid cyst and consulted with Dr. oDminguez for resection. He presents to Presurgical Testing for evaluation prior to Right Inter Hemispheric Transcallosal Approach For Craniotomy of Colloid Cyst on 11/22/23.     HOSPITAL COURSE:  12/20- POD0 resection of colloid cyst  12/21- ambulate and advance diet as tolerated. No evidence of seizures on EEG. Follow up with Psych for MDD      Allergies    No Known Allergies    Intolerances      REVIEW OF SYSTEMS: [ ] Unable to Assess due to neurologic exam   [X] All ROS addressed below are non-contributory, except:  Neuro: [ ] Headache [ ] Back pain [ ] Numbness [ ] Weakness [ ] Ataxia [ ] Dizziness [ ] Aphasia [ ] Dysarthria [ ] Visual disturbance  Resp: [ ] Shortness of breath/dyspnea, [ ] Orthopnea [ ] Cough  CV: [ ] Chest pain [ ] Palpitation [ ] Lightheadedness [ ] Syncope  Renal: [ ] Thirst [ ] Edema  GI: [ ] Nausea [ ] Emesis [ ] Abdominal pain [ ] Constipation [ ] Diarrhea  Hem: [ ] Hematemesis [ ] bright red blood per rectum  ID: [ ] Fever [ ] Chills [ ] Dysuria  ENT: [ ] Rhinorrhea      DEVICES:   [ ] Restraints [ ] ET tube [ ] central line [ ] arterial line [ ] meade [ ] NGT/OGT [ ] EVD [ ] LD [ ] CARY/HMV [ ] Trach [ ] PEG [ ] Chest Tube     VITALS:   ICU Vital Signs Last 24 Hrs  T(C): 36.8 (21 Dec 2023 03:00), Max: 37.1 (20 Dec 2023 15:30)  T(F): 98.3 (21 Dec 2023 03:00), Max: 98.8 (20 Dec 2023 15:30)  HR: 78 (21 Dec 2023 06:00) (77 - 120)  BP: 124/77 (20 Dec 2023 16:00) (119/73 - 127/79)  BP(mean): 94 (20 Dec 2023 16:00) (88 - 95)  ABP: 110/66 (21 Dec 2023 06:00) (105/65 - 333/327)  ABP(mean): 84 (21 Dec 2023 06:00) (82 - 330)  RR: 15 (21 Dec 2023 06:00) (11 - 24)  SpO2: 100% (21 Dec 2023 06:00) (97% - 100%)    O2 Parameters below as of 20 Dec 2023 16:25  Patient On (Oxygen Delivery Method): room air      I&O's Summary    20 Dec 2023 07:01  -  21 Dec 2023 07:00  --------------------------------------------------------  IN: 1025 mL / OUT: 1800 mL / NET: -775 mL      LABS:                        14.3   7.42  )-----------( 288      ( 20 Dec 2023 16:07 )             40.6   12-20    145  |  104  |  15  ----------------------------<  147<H>  4.4   |  26  |  1.05           MEDICATION LEVELS:   MEDICATIONS  (STANDING):  chlorhexidine 4% Liquid 1 Application(s) Topical daily  dexAMETHasone  Injectable 4 milliGRAM(s) IV Push every 6 hours  influenza   Vaccine 0.5 milliLiter(s) IntraMuscular once  levETIRAcetam 500 milliGRAM(s) Oral two times a day  pantoprazole    Tablet 40 milliGRAM(s) Oral before breakfast  polyethylene glycol 3350 17 Gram(s) Oral daily  senna 1 Tablet(s) Oral at bedtime    MEDICATIONS  (PRN):  acetaminophen     Tablet .. 650 milliGRAM(s) Oral every 6 hours PRN Mild Pain (1 - 3)  oxyCODONE    IR 5 milliGRAM(s) Oral every 4 hours PRN Moderate Pain (4 - 6)  oxyCODONE    IR 10 milliGRAM(s) Oral every 4 hours PRN Severe Pain (7 - 10)    EXAMINATION:  PHYSICAL EXAM:    Constitutional: No Acute Distress     Neurological: Awake, alert oriented to person, place and time, Following Commands, PERRL, EOMI, No Gaze Preference, Face Symmetrical, Speech Fluent, No dysmetria, No ataxia, No nystagmus     Motor exam:          Upper extremity                         Delt     Bicep     Tricep    HG                                                 R         5/5 5/5 5/5 5/5                                               L          5/5 5/5 5/5 5/5          Lower extremity                        HF         KF        KE       DF         PF                                                  R        5/5 5/5 5/5 5/5 5/5                                               L         5/5 5/5 5/5 5/5 5/5                                                 Sensation: [ ] intact to light touch  [ ] decreased:     Reflexes: Deep Tendon Reflexes Intact     Pulmonary: Clear to Auscultation, No rales, No rhonchi, No wheezes     Cardiovascular: S1, S2, Regular rate and rhythm     Gastrointestinal: Soft, Non-tender, Non-distended     Extremities: No calf tenderness     Incision: dry and clean   39 yo M with a PMH of Rathke's cleft cyst s/p endoscopic endonasal transphenoidal Rathke cyst drainage and septoplasty by Dr. Chinchilla on 5/8/2023. Since then he has chief complaint of sudden dropping/fainting episodes. It is so severe it has impacted his quality of life poorly. These episodes are spontaneous, non provoked and can occur multiple times per day. He has a known third ventricle colloid cyst and consulted with Dr. Dominguez for resection. He presents to Presurgical Testing for evaluation prior to Right Inter Hemispheric Transcallosal Approach For Craniotomy of Colloid Cyst on 11/22/23.     HOSPITAL COURSE:  12/20- POD0 resection of colloid cyst  12/21- ambulate and advance diet as tolerated. No evidence of seizures on EEG. Follow up with Psych for MDD      Allergies    No Known Allergies    Intolerances      REVIEW OF SYSTEMS: [ ] Unable to Assess due to neurologic exam   [X] All ROS addressed below are non-contributory, except:  Neuro: [ ] Headache [ ] Back pain [ ] Numbness [ ] Weakness [ ] Ataxia [ ] Dizziness [ ] Aphasia [ ] Dysarthria [ ] Visual disturbance  Resp: [ ] Shortness of breath/dyspnea, [ ] Orthopnea [ ] Cough  CV: [ ] Chest pain [ ] Palpitation [ ] Lightheadedness [ ] Syncope  Renal: [ ] Thirst [ ] Edema  GI: [ ] Nausea [ ] Emesis [ ] Abdominal pain [ ] Constipation [ ] Diarrhea  Hem: [ ] Hematemesis [ ] bright red blood per rectum  ID: [ ] Fever [ ] Chills [ ] Dysuria  ENT: [ ] Rhinorrhea      DEVICES:   [ ] Restraints [ ] ET tube [ ] central line [ ] arterial line [ ] meade [ ] NGT/OGT [ ] EVD [ ] LD [ ] CARY/HMV [ ] Trach [ ] PEG [ ] Chest Tube     VITALS:   ICU Vital Signs Last 24 Hrs  T(C): 36.8 (21 Dec 2023 03:00), Max: 37.1 (20 Dec 2023 15:30)  T(F): 98.3 (21 Dec 2023 03:00), Max: 98.8 (20 Dec 2023 15:30)  HR: 78 (21 Dec 2023 06:00) (77 - 120)  BP: 124/77 (20 Dec 2023 16:00) (119/73 - 127/79)  BP(mean): 94 (20 Dec 2023 16:00) (88 - 95)  ABP: 110/66 (21 Dec 2023 06:00) (105/65 - 333/327)  ABP(mean): 84 (21 Dec 2023 06:00) (82 - 330)  RR: 15 (21 Dec 2023 06:00) (11 - 24)  SpO2: 100% (21 Dec 2023 06:00) (97% - 100%)    O2 Parameters below as of 20 Dec 2023 16:25  Patient On (Oxygen Delivery Method): room air      I&O's Summary    20 Dec 2023 07:01  -  21 Dec 2023 07:00  --------------------------------------------------------  IN: 1025 mL / OUT: 1800 mL / NET: -775 mL      LABS:                        14.3   7.42  )-----------( 288      ( 20 Dec 2023 16:07 )             40.6   12-20    145  |  104  |  15  ----------------------------<  147<H>  4.4   |  26  |  1.05           MEDICATION LEVELS:   MEDICATIONS  (STANDING):  chlorhexidine 4% Liquid 1 Application(s) Topical daily  dexAMETHasone  Injectable 4 milliGRAM(s) IV Push every 6 hours  influenza   Vaccine 0.5 milliLiter(s) IntraMuscular once  levETIRAcetam 500 milliGRAM(s) Oral two times a day  pantoprazole    Tablet 40 milliGRAM(s) Oral before breakfast  polyethylene glycol 3350 17 Gram(s) Oral daily  senna 1 Tablet(s) Oral at bedtime    MEDICATIONS  (PRN):  acetaminophen     Tablet .. 650 milliGRAM(s) Oral every 6 hours PRN Mild Pain (1 - 3)  oxyCODONE    IR 5 milliGRAM(s) Oral every 4 hours PRN Moderate Pain (4 - 6)  oxyCODONE    IR 10 milliGRAM(s) Oral every 4 hours PRN Severe Pain (7 - 10)    EXAMINATION:  PHYSICAL EXAM:    Constitutional: No Acute Distress     Neurological: Awake, alert oriented to person, place and time, Following Commands, PERRL, EOMI, No Gaze Preference, Face Symmetrical, Speech Fluent, No dysmetria, No ataxia, No nystagmus     Motor exam:          Upper extremity                         Delt     Bicep     Tricep    HG                                                 R         5/5 5/5 5/5 5/5                                               L          5/5 5/5 5/5 5/5          Lower extremity                        HF         KF        KE       DF         PF                                                  R        5/5 5/5 5/5 5/5 5/5                                               L         5/5 5/5 5/5 5/5 5/5                                                 Sensation: [ ] intact to light touch  [ ] decreased:     Reflexes: Deep Tendon Reflexes Intact     Pulmonary: Clear to Auscultation, No rales, No rhonchi, No wheezes     Cardiovascular: S1, S2, Regular rate and rhythm     Gastrointestinal: Soft, Non-tender, Non-distended     Extremities: No calf tenderness     Incision: dry and clean

## 2023-12-21 NOTE — PROGRESS NOTE ADULT - SUBJECTIVE AND OBJECTIVE BOX
Patient seen and examined at bedside.    --Anticoagulation--    T(C): 37.1 (12-20-23 @ 23:00), Max: 37.1 (12-20-23 @ 15:30)  HR: 80 (12-21-23 @ 02:00) (79 - 120)  BP: 124/77 (12-20-23 @ 16:00) (119/73 - 127/79)  RR: 17 (12-21-23 @ 02:00) (11 - 24)  SpO2: 100% (12-21-23 @ 02:00) (97% - 100%)  Wt(kg): --    Exam: AOx3, flat affect, PERRL, EOMI, no facial, no drift, CONCEPCION 5/5

## 2023-12-21 NOTE — BH CONSULTATION LIAISON ASSESSMENT NOTE - OTHER PAST PSYCHIATRIC HISTORY (INCLUDE DETAILS REGARDING ONSET, COURSE OF ILLNESS, INPATIENT/OUTPATIENT TREATMENT)
Diagnosed with ADHD, currently not taking medication Diagnosed with ADHD, currently not taking medication. Was taking stimulants and sleep medication (clonapin) a few months ago but stopped. Diagnosed with ADHD, currently not taking medication. Was taking stimulants and sleep medication (clonazepam) a few months ago but stopped.    denies prior SA, SIB, or psychiatric admissions

## 2023-12-21 NOTE — BH CONSULTATION LIAISON ASSESSMENT NOTE - PERSONAL COLLATERAL PHONE
No respiratory distress. No stridor, Lungs sounds clear with good aeration bilaterally. [id number] [phone]

## 2023-12-21 NOTE — PHYSICAL THERAPY INITIAL EVALUATION ADULT - PERTINENT HX OF CURRENT PROBLEM, REHAB EVAL
PMHx: Rathke's cleft cyst s/p endoscopic endonasal transphenoidal rathke cyst drainage and septoplasty by Dr. Chinchilla on 5/8/2023. Since then he has chief complaint of sudden dropping/fainting episodes. It is so severe it has impacted his quality of life poorly. These episodes are spontaneous, non provoked and can occur multiple times per day. He has a known third ventricle colloid cyst and consulted with Dr. Dominguez for resection. s/p Right parasagittal craniotomy and interhemispheric transcallosal excision of a septal colloid cyst 12/20

## 2023-12-21 NOTE — OCCUPATIONAL THERAPY INITIAL EVALUATION ADULT - PERTINENT HX OF CURRENT PROBLEM, REHAB EVAL
39 yo M with a PMH of Rathke's cleft cyst s/p endoscopic endonasal transphenoidal Rathke cyst drainage and septoplasty by Dr. Chinchilla on 5/8/2023. Since then he has chief complaint of sudden dropping/fainting episodes. It is so severe it has impacted his quality of life poorly. These episodes are spontaneous, non provoked and can occur multiple times per day. He has a known third ventricle colloid cyst and consulted with Dr. Dominguez for resection. He presents to Presurgical Testing for evaluation prior to Right Inter Hemispheric Transcallosal Approach For Craniotomy of Colloid Cyst on 11/22/23. now s/p Right parasagittal craniotomy and interhemispheric transcallosal excision of a septal colloid cyst 12/20. 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 Chinchilla on 5/8/2023. Since then he has chief complaint of sudden dropping/fainting episodes. It is so severe it has impacted his quality of life poorly. These episodes are spontaneous, non provoked and can occur multiple times per day. He has a known third ventricle colloid cyst and consulted with Dr. Dominguez for resection. He presents to Presurgical Testing for evaluation prior to Right Inter Hemispheric Transcallosal Approach For Craniotomy of Colloid Cyst on 11/22/23. now s/p Right parasagittal craniotomy and interhemispheric transcallosal excision of a septal colloid cyst 12/20.

## 2023-12-21 NOTE — BH CONSULTATION LIAISON ASSESSMENT NOTE - NSSUICPROTFACT_PSY_ALL_CORE
Identifies reasons for living Responsibility to children, family, or others/Identifies reasons for living/Supportive social network of family or friends/Fear of death or the actual act of killing self/Positive therapeutic relationships

## 2023-12-21 NOTE — BH CONSULTATION LIAISON ASSESSMENT NOTE - PAST PSYCHOTROPIC MEDICATION
ADHD medication in the past ADHD medication (stimulants) + clonapin for anxiety/insomnia a few months ago

## 2023-12-22 ENCOUNTER — TRANSCRIPTION ENCOUNTER (OUTPATIENT)
Age: 38
End: 2023-12-22

## 2023-12-22 VITALS
DIASTOLIC BLOOD PRESSURE: 69 MMHG | SYSTOLIC BLOOD PRESSURE: 113 MMHG | HEART RATE: 78 BPM | OXYGEN SATURATION: 99 % | TEMPERATURE: 98 F | RESPIRATION RATE: 18 BRPM

## 2023-12-22 LAB
SURGICAL PATHOLOGY STUDY: SIGNIFICANT CHANGE UP
SURGICAL PATHOLOGY STUDY: SIGNIFICANT CHANGE UP

## 2023-12-22 PROCEDURE — 82435 ASSAY OF BLOOD CHLORIDE: CPT

## 2023-12-22 PROCEDURE — 70553 MRI BRAIN STEM W/O & W/DYE: CPT

## 2023-12-22 PROCEDURE — 82330 ASSAY OF CALCIUM: CPT

## 2023-12-22 PROCEDURE — A9585: CPT

## 2023-12-22 PROCEDURE — 82803 BLOOD GASES ANY COMBINATION: CPT

## 2023-12-22 PROCEDURE — 85027 COMPLETE CBC AUTOMATED: CPT

## 2023-12-22 PROCEDURE — 85610 PROTHROMBIN TIME: CPT

## 2023-12-22 PROCEDURE — 85730 THROMBOPLASTIN TIME PARTIAL: CPT

## 2023-12-22 PROCEDURE — 88307 TISSUE EXAM BY PATHOLOGIST: CPT

## 2023-12-22 PROCEDURE — 84100 ASSAY OF PHOSPHORUS: CPT

## 2023-12-22 PROCEDURE — 82947 ASSAY GLUCOSE BLOOD QUANT: CPT

## 2023-12-22 PROCEDURE — 85018 HEMOGLOBIN: CPT

## 2023-12-22 PROCEDURE — C1713: CPT

## 2023-12-22 PROCEDURE — 83605 ASSAY OF LACTIC ACID: CPT

## 2023-12-22 PROCEDURE — C1889: CPT

## 2023-12-22 PROCEDURE — 84132 ASSAY OF SERUM POTASSIUM: CPT

## 2023-12-22 PROCEDURE — 80048 BASIC METABOLIC PNL TOTAL CA: CPT

## 2023-12-22 PROCEDURE — 36415 COLL VENOUS BLD VENIPUNCTURE: CPT

## 2023-12-22 PROCEDURE — 83735 ASSAY OF MAGNESIUM: CPT

## 2023-12-22 PROCEDURE — 95714 VEEG EA 12-26 HR UNMNTR: CPT

## 2023-12-22 PROCEDURE — 84295 ASSAY OF SERUM SODIUM: CPT

## 2023-12-22 PROCEDURE — 85014 HEMATOCRIT: CPT

## 2023-12-22 PROCEDURE — 70450 CT HEAD/BRAIN W/O DYE: CPT

## 2023-12-22 PROCEDURE — 82565 ASSAY OF CREATININE: CPT

## 2023-12-22 PROCEDURE — 95700 EEG CONT REC W/VID EEG TECH: CPT

## 2023-12-22 PROCEDURE — 97161 PT EVAL LOW COMPLEX 20 MIN: CPT

## 2023-12-22 PROCEDURE — C1769: CPT

## 2023-12-22 PROCEDURE — 70450 CT HEAD/BRAIN W/O DYE: CPT | Mod: 26

## 2023-12-22 PROCEDURE — 97166 OT EVAL MOD COMPLEX 45 MIN: CPT

## 2023-12-22 RX ORDER — MAGNESIUM HYDROXIDE 400 MG/1
30 TABLET, CHEWABLE ORAL ONCE
Refills: 0 | Status: COMPLETED | OUTPATIENT
Start: 2023-12-22 | End: 2023-12-22

## 2023-12-22 RX ORDER — LEVETIRACETAM 250 MG/1
1 TABLET, FILM COATED ORAL
Qty: 60 | Refills: 0
Start: 2023-12-22 | End: 2024-01-20

## 2023-12-22 RX ORDER — OXYCODONE HYDROCHLORIDE 5 MG/1
1 TABLET ORAL
Qty: 12 | Refills: 0
Start: 2023-12-22 | End: 2023-12-24

## 2023-12-22 RX ORDER — DEXAMETHASONE 0.5 MG/5ML
2 ELIXIR ORAL
Qty: 23 | Refills: 0
Start: 2023-12-22

## 2023-12-22 RX ORDER — DEXAMETHASONE 0.5 MG/5ML
4 ELIXIR ORAL EVERY 6 HOURS
Refills: 0 | Status: DISCONTINUED | OUTPATIENT
Start: 2023-12-22 | End: 2023-12-22

## 2023-12-22 RX ORDER — POLYETHYLENE GLYCOL 3350 17 G/17G
17 POWDER, FOR SOLUTION ORAL
Qty: 0 | Refills: 0 | DISCHARGE
Start: 2023-12-22

## 2023-12-22 RX ORDER — SENNA PLUS 8.6 MG/1
1 TABLET ORAL
Qty: 0 | Refills: 0 | DISCHARGE
Start: 2023-12-22

## 2023-12-22 RX ORDER — POLYETHYLENE GLYCOL 3350 17 G/17G
17 POWDER, FOR SOLUTION ORAL EVERY 12 HOURS
Refills: 0 | Status: DISCONTINUED | OUTPATIENT
Start: 2023-12-22 | End: 2023-12-22

## 2023-12-22 RX ORDER — ACETAMINOPHEN 500 MG
2 TABLET ORAL
Qty: 0 | Refills: 0 | DISCHARGE
Start: 2023-12-22

## 2023-12-22 RX ORDER — PANTOPRAZOLE SODIUM 20 MG/1
1 TABLET, DELAYED RELEASE ORAL
Qty: 7 | Refills: 0
Start: 2023-12-22 | End: 2023-12-28

## 2023-12-22 RX ADMIN — OXYCODONE HYDROCHLORIDE 10 MILLIGRAM(S): 5 TABLET ORAL at 18:24

## 2023-12-22 RX ADMIN — Medication 650 MILLIGRAM(S): at 10:35

## 2023-12-22 RX ADMIN — OXYCODONE HYDROCHLORIDE 10 MILLIGRAM(S): 5 TABLET ORAL at 17:54

## 2023-12-22 RX ADMIN — MAGNESIUM HYDROXIDE 30 MILLILITER(S): 400 TABLET, CHEWABLE ORAL at 10:04

## 2023-12-22 RX ADMIN — Medication 4 MILLIGRAM(S): at 11:57

## 2023-12-22 RX ADMIN — Medication 650 MILLIGRAM(S): at 10:05

## 2023-12-22 RX ADMIN — OXYCODONE HYDROCHLORIDE 10 MILLIGRAM(S): 5 TABLET ORAL at 10:35

## 2023-12-22 RX ADMIN — Medication 4 MILLIGRAM(S): at 05:08

## 2023-12-22 RX ADMIN — OXYCODONE HYDROCHLORIDE 10 MILLIGRAM(S): 5 TABLET ORAL at 05:44

## 2023-12-22 RX ADMIN — POLYETHYLENE GLYCOL 3350 17 GRAM(S): 17 POWDER, FOR SOLUTION ORAL at 17:54

## 2023-12-22 RX ADMIN — OXYCODONE HYDROCHLORIDE 10 MILLIGRAM(S): 5 TABLET ORAL at 05:14

## 2023-12-22 RX ADMIN — Medication 1 ENEMA: at 17:31

## 2023-12-22 RX ADMIN — LEVETIRACETAM 500 MILLIGRAM(S): 250 TABLET, FILM COATED ORAL at 17:54

## 2023-12-22 RX ADMIN — PANTOPRAZOLE SODIUM 40 MILLIGRAM(S): 20 TABLET, DELAYED RELEASE ORAL at 05:10

## 2023-12-22 RX ADMIN — MAGNESIUM HYDROXIDE 30 MILLILITER(S): 400 TABLET, CHEWABLE ORAL at 14:13

## 2023-12-22 RX ADMIN — LEVETIRACETAM 500 MILLIGRAM(S): 250 TABLET, FILM COATED ORAL at 05:08

## 2023-12-22 RX ADMIN — OXYCODONE HYDROCHLORIDE 10 MILLIGRAM(S): 5 TABLET ORAL at 10:05

## 2023-12-22 RX ADMIN — Medication 4 MILLIGRAM(S): at 17:54

## 2023-12-22 NOTE — DISCHARGE NOTE PROVIDER - REASON FOR ADMISSION
s/p Right parasagittal craniotomy and interhemispheric transcallosal excision of a septal colloid cyst

## 2023-12-22 NOTE — DISCHARGE NOTE NURSING/CASE MANAGEMENT/SOCIAL WORK - PATIENT PORTAL LINK FT
You can access the FollowMyHealth Patient Portal offered by [facility] by registering at the following website: [email or web address]. By joining [facility]’s FollowMyHealth portal, you will also be able to view your health information using other applications (apps) compatible with our system. You can access the FollowMyHealth Patient Portal offered by [facility] by registering at the following website: [email or web address]. By joining [facility]’s FollowMyHealth portal, you will also be able to view your health information using other applications (apps) compatible with our system.

## 2023-12-22 NOTE — DISCHARGE NOTE PROVIDER - CARE PROVIDER_API CALL
Rigoberto Rashid  Neurosurgery  [address]  [place] [address]  Phone: [phone]  Fax: [phone]  Follow Up Time:    Rigoberto Rashid  Neurosurgery  [address]  [place] [address]  Phone: [phone]  Fax: [phone]  Follow Up Time:    Rigoberto Rashid  Neurosurgery  [address]  [place] [address]  Phone: [phone]  Fax: [phone]  Follow Up Time:

## 2023-12-22 NOTE — DISCHARGE NOTE PROVIDER - CARE PROVIDERS DIRECT ADDRESSES
[email or web address]

## 2023-12-22 NOTE — DISCHARGE NOTE PROVIDER - HOSPITAL COURSE
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 Chinchilla on 5/8/2023. Since then he has chief complaint of sudden dropping/fainting episodes. It is so severe it has impacted his quality of life poorly. These episodes are spontaneous, non provoked and can occur multiple times per day. He has a known third ventricle colloid cyst and consulted with Dr. Dominguez for resection. He presents to [facility] for evaluation prior to Right Inter Hemispheric Transcallosal Approach For Craniotomy of Colloid Cyst on12/20/23. Patient denies recent fever, chills, chest pain, SOB, palpitations, or recent exposure to COVID-19.      Patient went to the OR for planned right parasagittal craniotomy and interhemispheric transcallosal excision of a septal colloid cyst on 12/20/23. Patient tolerated procedure well and was in the [facility] for post-op care and management. Patient had his post-op CT done on 12/21 which showed post-op changes and interval resection of colloid cyst. Patient had EEG which was negative for seizures. Patient had his MRI Brain 12/21 post-op showed post-op changes and trace right sided  subdural hematoma, he then subsequently had a follow up CT on 12/22 which was stable. [facility] and continued to improve, and was seen by Psych secondary to concerns for suicidal ideation (no plans) and depression, patient was evaluated and deemed no psychiatric indications for discharge. Physical Therapy and Occupational Therapy saw patient and deemed home with no skilled needs. On day of discharge patient is neurologically and hemodynamically stable for discharge. 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 Chinchilla on 5/8/2023. Since then he has chief complaint of sudden dropping/fainting episodes. It is so severe it has impacted his quality of life poorly. These episodes are spontaneous, non provoked and can occur multiple times per day. He has a known third ventricle colloid cyst and consulted with Dr. Dominguez for resection. He presents to [facility] for evaluation prior to Right Inter Hemispheric Transcallosal Approach For Craniotomy of Colloid Cyst on12/20/23. Patient denies recent fever, chills, chest pain, SOB, palpitations, or recent exposure to COVID-19.      Patient went to the OR for planned right parasagittal craniotomy and interhemispheric transcallosal excision of a septal colloid cyst on 12/20/23. Patient tolerated procedure well and was in the NSCU for post-op care and management. Patient had his post-op CT done on 12/21 which showed post-op changes and interval resection of colloid cyst. Patient had EEG which was negative for seizures. Patient had his MRI Brain 12/21 post-op showed post-op changes and trace right sided  subdural hematoma, he then subsequently had a follow up CT on 12/22 which was stable. [facility] and continued to improve, and was seen by Psych secondary to concerns for suicidal ideation (no plans) and depression, patient was evaluated and deemed no psychiatric indications for discharge. Physical Therapy and Occupational Therapy saw patient and deemed home with no skilled needs. On day of discharge patient is neurologically and hemodynamically stable for discharge. 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 Chinchilla on 5/8/2023. Since then he has chief complaint of sudden dropping/fainting episodes. It is so severe it has impacted his quality of life poorly. These episodes are spontaneous, non provoked and can occur multiple times per day. He has a known third ventricle colloid cyst and consulted with Dr. Dominguez for resection. He presents to [facility] for evaluation prior to Right Inter Hemispheric Transcallosal Approach For Craniotomy of Colloid Cyst on12/20/23. Patient denies recent fever, chills, chest pain, SOB, palpitations, or recent exposure to COVID-19.      Patient went to the OR for planned right parasagittal craniotomy and interhemispheric transcallosal excision of a septal colloid cyst on 12/20/23. Patient tolerated procedure well and was in the NSCU for post-op care and management. Patient had his post-op CT done on 12/21 which showed post-op changes and interval resection of colloid cyst. Patient had EEG which was negative for seizures. Patient had his MRI Brain 12/21 post-op showed post-op changes and trace right sided  subdural hematoma, he then subsequently had a follow up CT on 12/22 which was stable. [facility] and continued to improve, and was seen by Psych secondary to concerns for suicidal ideation (no plans) and depression, patient was evaluated and deemed no psychiatric indications for discharge. Physical Therapy and Occupational Therapy saw patient and deemed home with no skilled needs. On day of discharge patient is neurologically and hemodynamically stable for discharge. 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 Chinchilla on 5/8/2023. Since then he has chief complaint of sudden dropping/fainting episodes. It is so severe it has impacted his quality of life poorly. These episodes are spontaneous, non provoked and can occur multiple times per day. He has a known third ventricle colloid cyst and consulted with Dr. Dominguez for resection. He presents to [facility] for evaluation prior to Right Inter Hemispheric Transcallosal Approach For Craniotomy of Colloid Cyst on12/20/23. Patient denies recent fever, chills, chest pain, SOB, palpitations, or recent exposure to COVID-19.      Patient went to the OR for planned right parasagittal craniotomy and interhemispheric transcallosal excision of a septal colloid cyst on 12/20/23. Patient had intra-op seizure and was kept on Keppra post-op, had EEG from 12/20-21 which was negative for seizures. Patient had his post-op CT done on 12/21 which showed post-op changes and interval resection of colloid cyst. Patient had his MRI Brain 12/21 post-op showed post-op changes and trace right sided  subdural hematoma, he then subsequently had a follow up CT on 12/22 which was stable. [facility] and [facility] to improve, and was seen by Psych secondary to concerns for suicidal ideation (no plans) and depression, patient was evaluated and deemed no psychiatric indications for discharge. Physical Therapy and Occupational Therapy saw patient and deemed home with no skilled needs. On day of discharge patient is neurologically and hemodynamically stable for discharge. 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 Chinchilla on 5/8/2023. Since then he has chief complaint of sudden dropping/fainting episodes. It is so severe it has impacted his quality of life poorly. These episodes are spontaneous, non provoked and can occur multiple times per day. He has a known third ventricle colloid cyst and consulted with Dr. Dominguez for resection. He presents to [facility] for evaluation prior to Right Inter Hemispheric Transcallosal Approach For Craniotomy of Colloid Cyst on12/20/23. Patient denies recent fever, chills, chest pain, SOB, palpitations, or recent exposure to COVID-19.      Patient went to the OR for planned right parasagittal craniotomy and interhemispheric transcallosal excision of a septal colloid cyst on 12/20/23. Patient had intra-op seizure and was kept on Keppra post-op, had EEG from 12/20-21 which was negative for seizures. Patient had his post-op CT done on 12/21 which showed post-op changes and interval resection of colloid cyst. Patient had his MRI Brain 12/21 post-op showed post-op changes and trace right sided  subdural hematoma, he then subsequently had a follow up CT on 12/22 which was stable. [facility], and was seen by Psych secondary to concerns for suicidal ideation (no plans) and depression, patient was evaluated and deemed no psychiatric indications for discharge. Physical Therapy and Occupational Therapy saw patient and deemed home with no skilled needs. On day of discharge patient is neurologically and hemodynamically stable for discharge. 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 Chinchilla on 5/8/2023. Since then he has chief complaint of sudden dropping/fainting episodes. It is so severe it has impacted his quality of life poorly. These episodes are spontaneous, non provoked and can occur multiple times per day. He has a known third ventricle colloid cyst and consulted with Dr. Dominguez for resection. He presents to [facility] for evaluation prior to Right Inter Hemispheric Transcallosal Approach For Craniotomy of Colloid Cyst on12/20/23. Patient denies recent fever, chills, chest pain, SOB, palpitations, or recent exposure to COVID-19.      Patient went to the OR for planned right parasagittal craniotomy and interhemispheric transcallosal excision of a septal colloid cyst on 12/20/23. Patient had intra-op seizure and was kept on Keppra post-op, had EEG from 12/20-21 which was negative for seizures. Patient had his post-op CT done on 12/21 which showed post-op changes and interval resection of colloid cyst. Patient had his MRI Brain 12/21 post-op showed post-op changes and trace right sided  subdural hematoma, he then subsequently had a follow up CT on 12/22 which was stable. [facility] and [facility], and was seen by Psych secondary to concerns for suicidal ideation (no plans) and depression, patient was evaluated and deemed no psychiatric indications for discharge. Physical Therapy and Occupational Therapy saw patient and deemed home with no skilled needs. On day of discharge patient is neurologically and hemodynamically stable for discharge.

## 2023-12-22 NOTE — DISCHARGE NOTE PROVIDER - NSDCCPCAREPLAN_GEN_ALL_CORE_FT
PRINCIPAL DISCHARGE DIAGNOSIS  Diagnosis: Congenital cerebral cysts  Assessment and Plan of Treatment: s/p right parasagittal craniotomy and interhemispheric transcallosal excision of a septal colloid cyst 12/20/23  Please make an appointment and follow up with Dr. Rashid in 1-2 weeks after discharge. You have     PRINCIPAL DISCHARGE DIAGNOSIS  Diagnosis: Congenital cerebral cysts  Assessment and Plan of Treatment: s/p right parasagittal craniotomy and interhemispheric transcallosal excision of a septal colloid cyst 12/20/23  Please follow up with Dr. Rashid on 1/3/24 at 9:30AM with him and his NP Sharmila. You have surgical sutures that are absorbable. You may shower on post-op day 4 which is on 12/24/23. Please DO NOT SCRUB at incision, pat dry only. No soap / no shampoo / no ointments over surgical incision site. Keep surgical incision clean and dry. Please continue to take Decadron (steroid) as a tapered dose. Decadron helps with swelling.  Some common side effects of decadron include increased appetite, irritability, difficulty sleeping, swelling in your ankles, heartburn, and increased sugars.  Please notify your doctor of any side effects. Please continue Keppra for seizure prophylaxis, Keppra helps control and prevent seizures.  The most common side effects include diarrhea or constipation, excessive sleepiness, irritability and mood changes.  Rare and sometimes serious side effects include rash. You may take Tylenol (Acetaminophen) as needed for mild pain or Oxycodone as needed for moderate to severe pain. Oxycodone helps to control pain. Oxycodone is an additive medication so it is important to limit the use of this medication.  Side effects include nausea, vomiting, constipation, dry mouth, lightheadedness, dizziness or drowsiness.  It is important to tell your physician if you experience any of these side effects. DO NOT take any NSAIDs (Advil, Aleve, Motrin, Ibuprofen) as these medications can increase your risk of bleeding after the surgery. Please make an appointment and follow up with your primary care provider in 1-2 weeks after discharge.     PRINCIPAL DISCHARGE DIAGNOSIS  Diagnosis: Congenital cerebral cysts  Assessment and Plan of Treatment: s/p right parasagittal craniotomy and interhemispheric transcallosal excision of a septal colloid cyst 12/20/23  Please follow up with Dr. Rashid on 1/3/24 at 9:30AM with him and his NP Sharmila. You have surgical sutures that are absorbable. You may shower on post-op day 4 which is on 12/24/23. Please DO NOT SCRUB at incision, pat dry only. No soap / no shampoo / no ointments over surgical incision site. Keep surgical incision clean and dry. Please continue to take Decadron (steroid) as a tapered dose. Take 2 tablets every 6 hours, then take 2 tablets every 8 hours, then 1 tablet every 8 hours for 2 days then 1 tablet every 12 hours, then 1 tablet and stop. Decadron helps with swelling.  Some common side effects of decadron include increased appetite, irritability, difficulty sleeping, swelling in your ankles, heartburn, and increased sugars.  Please notify your doctor of any side effects. Please continue Keppra for seizure prophylaxis, Keppra helps control and prevent seizures.  The most common side effects include diarrhea or constipation, excessive sleepiness, irritability and mood changes.  Rare and sometimes serious side effects include rash. You may take Tylenol (Acetaminophen) as needed for mild pain or Oxycodone as needed for moderate to severe pain. Oxycodone helps to control pain. Oxycodone is an additive medication so it is important to limit the use of this medication.  Side effects include nausea, vomiting, constipation, dry mouth, lightheadedness, dizziness or drowsiness.  Please make sure you have regular bowel movements while on Oxycodone, you may take bowel movement medications to help you. It is important to tell your physician if you experience any of these side effects. DO NOT take any NSAIDs (Advil, Aleve, Motrin, Ibuprofen) as these medications can increase your risk of bleeding after the surgery. Please make an appointment and follow up with your primary care provider in 1-2 weeks after discharge.      SECONDARY DISCHARGE DIAGNOSES  Diagnosis: Rathke's cyst  Assessment and Plan of Treatment: You have a history of prior endonasal endoscopic transphenoidal surgery, please continue nasal precautions. No straws, no nasal swabbing.    Diagnosis: Anxiety and depression  Assessment and Plan of Treatment: Please follow with your own therapist / primary care provider in 1-2 weeks after discharge.

## 2023-12-22 NOTE — DISCHARGE NOTE NURSING/CASE MANAGEMENT/SOCIAL WORK - NSDCPEFALRISK_GEN_ALL_CORE
For information on Fall & Injury Prevention, visit: [email or web address] OR  [email or web address] OR  https://www.cdc.gov/steadi/patient.html For information on Fall & Injury Prevention, visit: [email or web address] OR  [email or web address] OR  https://www.cdc.gov/steadi/patient.html

## 2023-12-22 NOTE — DISCHARGE NOTE PROVIDER - NSDCFUADDINST_GEN_ALL_CORE_FT
Please make all necessary appointments and follow up. Please DO NOT take any NSAIDs (Advil, Aleve, Motrin, Ibuprofen) until cleared by your Neurosurgeon. Please DO NOT do any heavy lifting, bending, twisting and straining. You have surgical staples, they will be removed on follow up with your Neurosurgeon. You may shower, but NO SOAP / NO SHAMPOO. DO NOT do any scrubbing. Pat dry only. Please come to the emergency room for any of the following: altered mental status, seizures, pain uncontrolled by pain medications, fevers, leaking / bleeding from surgical site, chest pain and shortness of breath.

## 2023-12-22 NOTE — PROGRESS NOTE ADULT - SUBJECTIVE AND OBJECTIVE BOX
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 Chinchilla on 5/8/2023. Since then he has chief complaint of sudden dropping/fainting episodes. It is so severe it has impacted his quality of life poorly. These episodes are spontaneous, non provoked and can occur multiple times per day. He has a known third ventricle colloid cyst and consulted with Dr. Dominguez for resection. He presents to Presurgical Testing for evaluation prior to Right Inter Hemispheric Transcallosal Approach For Craniotomy of Colloid Cyst on12/20/23. Patient denies recent fever, chills, chest pain, SOB, palpitations, or recent exposure to COVID-19. (12 Dec 2023 14:22)      OVERNIGHT EVENTS: [facility], patient seen and evaluated at bedside, reports his headache from pre-op is improving. Reports having no BM yet though passing flatus, states Milk of Magnesia works for him.     Vital Signs Last 24 Hrs  T(C): 36.8 (22 Dec 2023 08:48), Max: 37.1 (21 Dec 2023 20:42)  T(F): 98.2 (22 Dec 2023 08:48), Max: 98.8 (21 Dec 2023 20:42)  HR: 74 (22 Dec 2023 08:48) (74 - 92)  BP: 106/69 (22 Dec 2023 08:48) (100/62 - 117/71)  BP(mean): --  RR: 18 (22 Dec 2023 08:48) (17 - 18)  SpO2: 96% (22 Dec 2023 08:48) (94% - 100%)    Parameters below as of 22 Dec 2023 08:48  Patient On (Oxygen Delivery Method): room air        PHYSICAL EXAM:    General: No Acute Distress     Neurological: Awake, alert, Ox3 (name, place, date), PERRL, EOMI, following commands, uppers 5/5, lowers 5/5, SILT    Pulmonary: Clear to Auscultation, No Rales, No Rhonchi, No Wheezes     Cardiovascular: S1, S2, Regular Rate and Rhythm     Gastrointestinal: Soft, Nontender, Nondistended     Incision: Took down surgical dressing and incision has absorbable sutures in place C/D/I, replaced with new gauze and tegaderm    LABS:                        14.3   7.42  )-----------( 288      ( 20 Dec 2023 16:07 )             40.6    12-21    142  |  104  |  9   ----------------------------<  144<H>  4.2   |  29  |  0.83    Ca    9.1      21 Dec 2023 11:31  Phos  3.9     12-21  Mg     2.4     12-21    PT/INR - ( 20 Dec 2023 16:07 )   PT: 11.7 sec;   INR: 1.12 ratio         PTT - ( 20 Dec 2023 16:07 )  PTT:30.5 sec      12-21 @ 07:01  -  12-22 @ 07:00  --------------------------------------------------------  IN: 0 mL / OUT: 1200 mL / NET: -1200 mL      DRAINS: None    MEDICATIONS:  Antibiotics:    Neuro:  acetaminophen     Tablet .. 650 milliGRAM(s) Oral every 6 hours PRN Mild Pain (1 - 3)  levETIRAcetam 500 milliGRAM(s) Oral two times a day  oxyCODONE    IR 10 milliGRAM(s) Oral every 4 hours PRN Severe Pain (7 - 10)  oxyCODONE    IR 5 milliGRAM(s) Oral every 4 hours PRN Moderate Pain (4 - 6)    Cardiac:    Pulm:    GI/:  bisacodyl 5 milliGRAM(s) Oral every 12 hours  pantoprazole    Tablet 40 milliGRAM(s) Oral before breakfast  polyethylene glycol 3350 17 Gram(s) Oral every 12 hours  senna 1 Tablet(s) Oral at bedtime    Other:   chlorhexidine 4% Liquid 1 Application(s) Topical daily  dexAMETHasone     Tablet 4 milliGRAM(s) Oral every 6 hours  enoxaparin Injectable 40 milliGRAM(s) SubCutaneous <User Schedule>  influenza   Vaccine 0.5 milliLiter(s) IntraMuscular once    DIET: [x] Regular [] CCD [] Renal [] Puree [] Dysphagia [] Tube Feeds:     IMAGING:   < from: MR Head w/wo IV Cont (12.21.23 @ 18:13) >  IMPRESSION: Status post high anterior frontal approach resection of a   previously seen colloid cyst without colloid cyst residual. Small amount   of devitalized tissue around the postoperative bed and trace layering   blood products in the ventricular system.    Trace right-sided parietotemporal convexity subdural hemorrhage.    --- End of Report ---    MIREYA MALAVE MD; Attending Radiologist  This document has been electronically signed. Dec 21 2023 11:20PM    < end of copied text >    < from: CT Head No Cont (12.22.23 @ 07:53) >  IMPRESSION:    1.  Postoperative changes related to a intraventricular colloid cyst   resection.  2.  No significant midline shift.      --- End of Report ---    AGUSTINA DAILY DO; Resident Radiologist  This document has been electronically signed.  SHAUN NIETO MD; Attending Radiologist  This document has been electronically signed. 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 Chinchilla on 5/8/2023. Since then he has chief complaint of sudden dropping/fainting episodes. It is so severe it has impacted his quality of life poorly. These episodes are spontaneous, non provoked and can occur multiple times per day. He has a known third ventricle colloid cyst and consulted with Dr. Dominguez for resection. He presents to Presurgical Testing for evaluation prior to Right Inter Hemispheric Transcallosal Approach For Craniotomy of Colloid Cyst on12/20/23. Patient denies recent fever, chills, chest pain, SOB, palpitations, or recent exposure to COVID-19. (12 Dec 2023 14:22)      OVERNIGHT EVENTS: [facility], patient seen and evaluated at bedside, reports his headache from pre-op is improving. Reports having no BM yet though passing flatus, states Milk of Magnesia works for him.     Vital Signs Last 24 Hrs  T(C): 36.8 (22 Dec 2023 08:48), Max: 37.1 (21 Dec 2023 20:42)  T(F): 98.2 (22 Dec 2023 08:48), Max: 98.8 (21 Dec 2023 20:42)  HR: 74 (22 Dec 2023 08:48) (74 - 92)  BP: 106/69 (22 Dec 2023 08:48) (100/62 - 117/71)  BP(mean): --  RR: 18 (22 Dec 2023 08:48) (17 - 18)  SpO2: 96% (22 Dec 2023 08:48) (94% - 100%)    Parameters below as of 22 Dec 2023 08:48  Patient On (Oxygen Delivery Method): room air        PHYSICAL EXAM:    General: No Acute Distress     Neurological: Awake, alert, Ox3 (name, place, date), PERRL, EOMI, following commands, uppers 5/5, lowers 5/5, SILT    Pulmonary: Clear to Auscultation, No Rales, No Rhonchi, No Wheezes     Cardiovascular: S1, S2, Regular Rate and Rhythm     Gastrointestinal: Soft, Nontender, Nondistended     Incision: Took down surgical dressing and incision has absorbable sutures in place C/D/I, replaced with new gauze and tegaderm    LABS:                        14.3   7.42  )-----------( 288      ( 20 Dec 2023 16:07 )             40.6    12-21    142  |  104  |  9   ----------------------------<  144<H>  4.2   |  29  |  0.83    Ca    9.1      21 Dec 2023 11:31  Phos  3.9     12-21  Mg     2.4     12-21    PT/INR - ( 20 Dec 2023 16:07 )   PT: 11.7 sec;   INR: 1.12 ratio         PTT - ( 20 Dec 2023 16:07 )  PTT:30.5 sec      12-21 @ 07:01  -  12-22 @ 07:00  --------------------------------------------------------  IN: 0 mL / OUT: 1200 mL / NET: -1200 mL      DRAINS: None    MEDICATIONS:  Antibiotics:    Neuro:  acetaminophen     Tablet .. 650 milliGRAM(s) Oral every 6 hours PRN Mild Pain (1 - 3)  levETIRAcetam 500 milliGRAM(s) Oral two times a day  oxyCODONE    IR 10 milliGRAM(s) Oral every 4 hours PRN Severe Pain (7 - 10)  oxyCODONE    IR 5 milliGRAM(s) Oral every 4 hours PRN Moderate Pain (4 - 6)    Cardiac:    Pulm:    GI/:  bisacodyl 5 milliGRAM(s) Oral every 12 hours  pantoprazole    Tablet 40 milliGRAM(s) Oral before breakfast  polyethylene glycol 3350 17 Gram(s) Oral every 12 hours  senna 1 Tablet(s) Oral at bedtime    Other:   chlorhexidine 4% Liquid 1 Application(s) Topical daily  dexAMETHasone     Tablet 4 milliGRAM(s) Oral every 6 hours  enoxaparin Injectable 40 milliGRAM(s) SubCutaneous <User Schedule>  influenza   Vaccine 0.5 milliLiter(s) IntraMuscular once    DIET: [x] Regular [] CCD [] Renal [] Puree [] Dysphagia [] Tube Feeds:     IMAGING:   < from: MR Head w/wo IV Cont (12.21.23 @ 18:13) >  IMPRESSION: Status post high anterior frontal approach resection of a   previously seen colloid cyst without colloid cyst residual. Small amount   of devitalized tissue around the postoperative bed and trace layering   blood products in the ventricular system.    Trace right-sided parietotemporal convexity subdural hemorrhage.    --- End of Report ---    MIREYA MALAVE MD; Attending Radiologist  This document has been electronically signed. Dec 21 2023 11:20PM    < end of copied text >    < from: CT Head No Cont (12.22.23 @ 07:53) >  IMPRESSION:    1.  Postoperative changes related to a intraventricular colloid cyst   resection.  2.  No significant midline shift.      --- End of Report ---    AGUSTINA DAILY DO; Resident Radiologist  This document has been electronically signed.  SHAUN NIETO MD; Attending Radiologist  This document has been electronically signed. 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 Chinchilla on 5/8/2023. Since then he has chief complaint of sudden dropping/fainting episodes. It is so severe it has impacted his quality of life poorly. These episodes are spontaneous, non provoked and can occur multiple times per day. He has a known third ventricle colloid cyst and consulted with Dr. Dominguez for resection. He presents to Presurgical Testing for evaluation prior to Right Inter Hemispheric Transcallosal Approach For Craniotomy of Colloid Cyst on12/20/23. Patient denies recent fever, chills, chest pain, SOB, palpitations, or recent exposure to COVID-19. (12 Dec 2023 14:22)      OVERNIGHT EVENTS: [facility], patient seen and evaluated at bedside, reports his headache from pre-op is improving. Reports having no BM yet though passing flatus, states Milk of Magnesia works for him.     Vital Signs Last 24 Hrs  T(C): 36.8 (22 Dec 2023 08:48), Max: 37.1 (21 Dec 2023 20:42)  T(F): 98.2 (22 Dec 2023 08:48), Max: 98.8 (21 Dec 2023 20:42)  HR: 74 (22 Dec 2023 08:48) (74 - 92)  BP: 106/69 (22 Dec 2023 08:48) (100/62 - 117/71)  BP(mean): --  RR: 18 (22 Dec 2023 08:48) (17 - 18)  SpO2: 96% (22 Dec 2023 08:48) (94% - 100%)    Parameters below as of 22 Dec 2023 08:48  Patient On (Oxygen Delivery Method): room air        PHYSICAL EXAM:    General: No Acute Distress     Neurological: Awake, alert, Ox3 (name, place, date), PERRL, EOMI, following commands, uppers 5/5, lowers 5/5, SILT    Pulmonary: Clear to Auscultation, No Rales, No Rhonchi, No Wheezes     Cardiovascular: S1, S2, Regular Rate and Rhythm     Gastrointestinal: Soft, Nontender, Nondistended     Incision: Took down surgical dressing and incision has absorbable sutures in place C/D/I, replaced with new gauze and tegaderm    LABS:                        14.3   7.42  )-----------( 288      ( 20 Dec 2023 16:07 )             40.6    12-21    142  |  104  |  9   ----------------------------<  144<H>  4.2   |  29  |  0.83    Ca    9.1      21 Dec 2023 11:31  Phos  3.9     12-21  Mg     2.4     12-21    PT/INR - ( 20 Dec 2023 16:07 )   PT: 11.7 sec;   INR: 1.12 ratio         PTT - ( 20 Dec 2023 16:07 )  PTT:30.5 sec      12-21 @ 07:01  -  12-22 @ 07:00  --------------------------------------------------------  IN: 0 mL / OUT: 1200 mL / NET: -1200 mL      DRAINS: None    MEDICATIONS:  Antibiotics:    Neuro:  acetaminophen     Tablet .. 650 milliGRAM(s) Oral every 6 hours PRN Mild Pain (1 - 3)  levETIRAcetam 500 milliGRAM(s) Oral two times a day  oxyCODONE    IR 10 milliGRAM(s) Oral every 4 hours PRN Severe Pain (7 - 10)  oxyCODONE    IR 5 milliGRAM(s) Oral every 4 hours PRN Moderate Pain (4 - 6)    Cardiac:    Pulm:    GI/:  bisacodyl 5 milliGRAM(s) Oral every 12 hours  pantoprazole    Tablet 40 milliGRAM(s) Oral before breakfast  polyethylene glycol 3350 17 Gram(s) Oral every 12 hours  senna 1 Tablet(s) Oral at bedtime    Other:   chlorhexidine 4% Liquid 1 Application(s) Topical daily  dexAMETHasone     Tablet 4 milliGRAM(s) Oral every 6 hours  enoxaparin Injectable 40 milliGRAM(s) SubCutaneous <User Schedule>  influenza   Vaccine 0.5 milliLiter(s) IntraMuscular once    DIET: [x] Regular [] CCD [] Renal [] Puree [] Dysphagia [] Tube Feeds:     IMAGING:   < from: MR Head w/wo IV Cont (12.21.23 @ 18:13) >  IMPRESSION: Status post high anterior frontal approach resection of a   previously seen colloid cyst without colloid cyst residual. Small amount   of devitalized tissue around the postoperative bed and trace layering   blood products in the ventricular system.    Trace right-sided parietotemporal convexity subdural hemorrhage.    --- End of Report ---    MIREYA MALAVE MD; Attending Radiologist  This document has been electronically signed. Dec 21 2023 11:20PM    < end of copied text >    < from: CT Head No Cont (12.22.23 @ 07:53) >  IMPRESSION:    1.  Postoperative changes related to a intraventricular colloid cyst   resection.  2.  No significant midline shift.      --- End of Report ---    AGUSTINA DAILY DO; Resident Radiologist  This document has been electronically signed.  SHAUN NIETO MD; Attending Radiologist  This document has been electronically signed. Dec 22 2023  8:44AM    < end of copied text >

## 2023-12-22 NOTE — DISCHARGE NOTE PROVIDER - NSDCFUSCHEDAPPT_GEN_ALL_CORE_FT
Sharmila Elizabeth  [facility]  NEUROSURG [address] [place] [address]  Scheduled Appointment: 01/03/2024     Sharmila Elizabeth  [facility]  NEUROSURG [address] [place] [address]  Scheduled Appointment: 01/03/2024     Sharmila Elizabeth  [facility]  NEUROSURG [address] [place] [address]  Scheduled Appointment: 01/03/2024

## 2023-12-22 NOTE — DISCHARGE NOTE PROVIDER - NSDCMRMEDTOKEN_GEN_ALL_CORE_FT
No current home medications:    acetaminophen 325 mg oral tablet: 2 tab(s) orally every 6 hours As needed Mild Pain (1 - 3)  aluminum hydroxide-magnesium hydroxide 200 mg-200 mg/5 mL oral suspension: 30 milliliter(s) orally every 6 hours As needed Dyspepsia  bisacodyl 5 mg oral delayed release tablet: 1 tab(s) orally every 12 hours  dexAMETHasone 2 mg oral tablet: 2 tab(s) orally every 6 hours take 2 tablets every 6 hours, then 2 tablets every 8 hours, then 1 tablet every 8 hours for 2 days, then 1 tablet every 12 hours, then 1 tablet and then stop.  levETIRAcetam 500 mg oral tablet: 1 tab(s) orally 2 times a day  oxyCODONE 5 mg oral tablet: 1 tab(s) orally every 6 hours as needed for Moderate Pain (4 - 6) MDD: 4  pantoprazole 40 mg oral delayed release tablet: 1 tab(s) orally once a day (before a meal)  polyethylene glycol 3350 oral powder for reconstitution: 17 gram(s) orally every 12 hours  senna leaf extract oral tablet: 1 tab(s) orally once a day (at bedtime)

## 2023-12-22 NOTE — PROGRESS NOTE ADULT - ASSESSMENT
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 Chinchilla on 5/8/2023. Since then he has chief complaint of sudden dropping/fainting episodes. It is so severe it has impacted his quality of life poorly. These episodes are spontaneous, non provoked and can occur multiple times per day. He has a known third ventricle colloid cyst and consulted with Dr. Dominguez for resection. He presents to [facility] for evaluation prior to Right Inter Hemispheric Transcallosal Approach For Craniotomy of Colloid Cyst on12/20/23.    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 Chinchilla on 5/8/2023. Since then he has chief complaint of sudden dropping/fainting episodes. It is so severe it has impacted his quality of life poorly. These episodes are spontaneous, non provoked and can occur multiple times per day. He has a known third ventricle colloid cyst and consulted with Dr. Dominguez for resection. He presents to [facility] Testing for evaluation prior to Right Inter Hemispheric Transcallosal Approach For Craniotomy of Colloid Cyst on12/20/23.    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 Chinchilla on 5/8/2023. Since then he has chief complaint of sudden dropping/fainting episodes. It is so severe it has impacted his quality of life poorly. These episodes are spontaneous, non provoked and can occur multiple times per day. He has a known third ventricle colloid cyst and consulted with Dr. Dominguez for resection. He presents to [facility] Testing for evaluation prior to Right Inter Hemispheric Transcallosal Approach For Craniotomy of Colloid Cyst on12/20/23.    s/p Right parasagittal craniotomy and interhemispheric transcallosal excision of a septal colloid cyst 12/20/23    NEURO:   - Continue neuro checks q 4  - Post op MRI done 12/21 post-op changes, no colloid cyst residual, trace right sided parietotemporal convexity subdural hemorrhage  - Follow up CT done today 12/22 reveals post-op changes / no MLS, stable  - Continue Keppra for seizure ppx (had intra-op seizure, was placed on EEG 12/20-12/21 which was negative)  - Pain control w/ Tylenol prn and Oxycodone prn  - Decadron for cerebral edema, 1 week taper  - PT/OT - no skilled needs    PULM:   - On room air, O2Sat>94%  - Incentive spirometry    CV:  - -140    ENDO:   - A1c 4.9, monitor glucose in the setting of steroids, stable    HEME/ONC:             12/20 CBC stable         DVT ppx: SQL, SCDs    RENAL:   - IVL  - 12/21 BMP stable  - Voiding    ID:   - Afebrile  - Received post-op abx    GI:    - Continue oral diet  - Continue protonix while on steroids  - Senna, miralax and dulcolax for bowel regimen, pending BM, reports Milk of Magnesia works for him, gave him x 1    DISCHARGE PLANNING:   PT/OT - no skilled needs, DC if BM    Plan to be discussed w/ Dr. 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 Chinchilla on 5/8/2023. Since then he has chief complaint of sudden dropping/fainting episodes. It is so severe it has impacted his quality of life poorly. These episodes are spontaneous, non provoked and can occur multiple times per day. He has a known third ventricle colloid cyst and consulted with Dr. Dominguez for resection. He presents to [facility] for evaluation prior to Right Inter Hemispheric Transcallosal Approach For Craniotomy of Colloid Cyst on12/20/23.    s/p Right parasagittal craniotomy and interhemispheric transcallosal excision of a septal colloid cyst 12/20/23    NEURO:   - Continue neuro checks q 4  - Post op MRI done 12/21 post-op changes, no colloid cyst residual, trace right sided parietotemporal convexity subdural hemorrhage  - Follow up CT done today 12/22 reveals post-op changes / no MLS, stable  - Continue Keppra for seizure ppx (had intra-op seizure, was placed on EEG 12/20-12/21 which was negative)  - Pain control w/ Tylenol prn and Oxycodone prn  - Decadron for cerebral edema, 1 week taper  - PT/OT - no skilled needs    PULM:   - On room air, O2Sat>94%  - Incentive spirometry    CV:  - -140    ENDO:   - A1c 4.9, monitor glucose in the setting of steroids, stable    HEME/ONC:             12/20 CBC stable         DVT ppx: SQL, SCDs    RENAL:   - IVL  - 12/21 BMP stable  - Voiding    ID:   - Afebrile  - Received post-op abx    GI:    - Continue oral diet  - Continue protonix while on steroids  - Senna, miralax and dulcolax for bowel regimen, pending BM, reports Milk of Magnesia works for him, gave him x 1    DISCHARGE PLANNING:   PT/OT - no skilled needs, DC if BM    Plan to be discussed w/ Dr. 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 Chinchilla on 5/8/2023. Since then he has chief complaint of sudden dropping/fainting episodes. It is so severe it has impacted his quality of life poorly. These episodes are spontaneous, non provoked and can occur multiple times per day. He has a known third ventricle colloid cyst and consulted with Dr. Dominguez for resection. He presents to [facility] for evaluation prior to Right Inter Hemispheric Transcallosal Approach For Craniotomy of Colloid Cyst on12/20/23.    s/p Right parasagittal craniotomy and interhemispheric transcallosal excision of a septal colloid cyst 12/20/23    NEURO:   - Continue neuro checks q 4  - Post op MRI done 12/21 post-op changes, no colloid cyst residual, trace right sided parietotemporal convexity subdural hemorrhage  - Follow up CT done today 12/22 reveals post-op changes / no MLS, stable  - Continue Keppra for seizure ppx (had intra-op seizure, was placed on EEG 12/20-12/21 which was negative)  - Pain control w/ Tylenol prn and Oxycodone prn  - Decadron for cerebral edema, 1 week taper  - PT/OT - no skilled needs    PULM:   - On room air, O2Sat>94%  - Incentive spirometry    CV:  - -140    ENDO:   - A1c 4.9, monitor glucose in the setting of steroids, stable    HEME/ONC:             12/20 CBC stable         DVT ppx: SQL, SCDs    RENAL:   - IVL  - 12/21 BMP stable  - Voiding    ID:   - Afebrile  - Received post-op abx    GI:    - Continue oral diet  - Continue protonix while on steroids  - Senna, miralax and dulcolax for bowel regimen, pending BM, reports Milk of Magnesia works for him, gave him x 1    DISCHARGE PLANNING:   PT/OT - no skilled needs, DC if BM    Plan to be discussed w/ Dr. 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 Chinchilla on 5/8/2023. Since then he has chief complaint of sudden dropping/fainting episodes. It is so severe it has impacted his quality of life poorly. These episodes are spontaneous, non provoked and can occur multiple times per day. He has a known third ventricle colloid cyst and consulted with Dr. Dominguez for resection. He presents to [facility] for evaluation prior to Right Inter Hemispheric Transcallosal Approach For Craniotomy of Colloid Cyst on12/20/23.    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, continue [employer] precautions, no straws, no incentive spirometry, no nose blowing, no nasal swabbing  - PT/OT - no skilled needs    PULM:   - On room air, O2Sat>94%  - No incentive spirometry -> hx of prior TSP - Skull[employer] precautions    CV:  - -140    ENDO:   - A1c 4.9, monitor glucose in the setting of steroids, stable    HEME/ONC:             12/20 CBC stable         DVT ppx: SQL, SCDs    RENAL:   - IVL  - 12/21 BMP stable  - Voiding    ID:   - Afebrile  - Received post-op abx    GI:    - Continue oral diet  - Continue protonix while on steroids  - Senna, miralax and dulcolax for bowel regimen, pending BM, reports Milk of Magnesia works for him, gave him x 1    DISCHARGE PLANNING:   PT/OT - no skilled needs, DC if BM    Plan to be discussed w/ Dr. 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 Chinchilla on 5/8/2023. Since then he has chief complaint of sudden dropping/fainting episodes. It is so severe it has impacted his quality of life poorly. These episodes are spontaneous, non provoked and can occur multiple times per day. He has a known third ventricle colloid cyst and consulted with Dr. Dominguez for resection. He presents to Pres[facility] Testing for evaluation prior to Right Inter Hemispheric Transcallosal Approach For Craniotomy of Colloid Cyst on12/20/23.    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, continue Skull[employer] precautions, no straws, no incentive spirometry, no nose blowing, no nasal swabbing  - PT/OT - no skilled needs    PULM:   - On room air, O2Sat>94%  - No incentive spirometry -> hx of prior TSP - Skullbase precautions    CV:  - -140    ENDO:   - A1c 4.9, monitor glucose in the setting of steroids, stable    HEME/ONC:             12/20 CBC stable         DVT ppx: SQL, SCDs    RENAL:   - IVL  - 12/21 BMP stable  - Voiding    ID:   - Afebrile  - Received post-op abx    GI:    - Continue oral diet  - Continue protonix while on steroids  - Senna, miralax and dulcolax for bowel regimen, pending BM, reports Milk of Magnesia works for him, gave him x 1    DISCHARGE PLANNING:   PT/OT - no skilled needs, DC if BM    Plan to be discussed w/ Dr. 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 Chinchilla on 5/8/2023. Since then he has chief complaint of sudden dropping/fainting episodes. It is so severe it has impacted his quality of life poorly. These episodes are spontaneous, non provoked and can occur multiple times per day. He has a known third ventricle colloid cyst and consulted with Dr. Dominguez for resection. He presents to [facility] Testing for evaluation prior to Right Inter Hemispheric Transcallosal Approach For Craniotomy of Colloid Cyst on12/20/23.    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, continue Skull[employer] precautions, no straws, no incentive spirometry, no nose blowing, no nasal swabbing  - PT/OT - no skilled needs    PULM:   - On room air, O2Sat>94%  - No incentive spirometry -> hx of prior TSP - Skullbase precautions    CV:  - -140    ENDO:   - A1c 4.9, monitor glucose in the setting of steroids, stable    HEME/ONC:             12/20 CBC stable         DVT ppx: SQL, SCDs    RENAL:   - IVL  - 12/21 BMP stable  - Voiding    ID:   - Afebrile  - Received post-op abx    GI:    - Continue oral diet  - Continue protonix while on steroids  - Senna, miralax and dulcolax for bowel regimen, pending BM, reports Milk of Magnesia works for him, gave him x 1    DISCHARGE PLANNING:   PT/OT - no skilled needs, DC if BM    Plan to be discussed w/ Dr. Rashid  [id number]

## 2024-01-01 NOTE — PROGRESS NOTE ADULT - PROBLEM/PLAN-1
DISPLAY PLAN FREE TEXT
Circumcision

## 2024-01-03 ENCOUNTER — APPOINTMENT (OUTPATIENT)
Dept: NEUROSURGERY | Facility: CLINIC | Age: 39
End: 2024-01-03

## 2024-01-10 ENCOUNTER — APPOINTMENT (OUTPATIENT)
Dept: NEUROSURGERY | Facility: CLINIC | Age: 39
End: 2024-01-10
Payer: MEDICAID

## 2024-01-10 VITALS
OXYGEN SATURATION: 100 % | HEART RATE: 82 BPM | HEIGHT: 70 IN | WEIGHT: 160 LBS | DIASTOLIC BLOOD PRESSURE: 76 MMHG | TEMPERATURE: 97.9 F | BODY MASS INDEX: 22.9 KG/M2 | SYSTOLIC BLOOD PRESSURE: 107 MMHG

## 2024-01-10 DIAGNOSIS — Q04.6 CONGENITAL CEREBRAL CYSTS: ICD-10-CM

## 2024-01-10 DIAGNOSIS — E23.6 OTHER DISORDERS OF PITUITARY GLAND: ICD-10-CM

## 2024-01-10 PROCEDURE — 99024 POSTOP FOLLOW-UP VISIT: CPT

## 2024-01-10 NOTE — HISTORY OF PRESENT ILLNESS
[FreeTextEntry1] :  This patient presents with several months history of drop attack and has a past history of surgery for Rathke cleft cyst few months ago,  at that time a colloid cyst was identified on the imaging, hyperdense on the CAT scan, hypointense on T1 MRI and isointense on T2 with no enhancement in favor of colloid cyst.  There was only a little area of the passage of CSF so the cyst is partially  occlusive.  There is however, no hydrocephalus.  After discussion of several treatment options including observation, continuation of serial MRI, or surgical resection of the cyst.  The patient would like to proceed with surgical resection as he is psychologically affected by the presence of the cyst  that can potentially cause obstructive hydrocephalus also thinks that his episodes of drop attack might be related to the presence of the cyst and he does not wish to proceed with observation.  On 12/20/23 he underwent a right frontal interhemispheric transcallosal approach and gross total resection of the third ventricle posterior colloid cyst Pt presents for his first post op visit today.  States that he is feeling well and denies headaches, weakness in his extremities, denies drop attacks. Surgical incision is healing well without any evidence of infection.

## 2024-01-10 NOTE — PHYSICAL EXAM
[General Appearance - Alert] : alert [General Appearance - Well Nourished] : well nourished [General Appearance - Well-Appearing] : healthy appearing [Healing Well] : healing well [Sutures Intact] : closed with intact sutures [No Drainage] : without drainage [Normal Skin] : normal [Erythema] : not erythematous [Tender] : not tender [Warm] : not warm [Indurated] : not indurated [Fluctuant] : not fluctuant [FreeTextEntry1] : right parasagittal craniotomy [Oriented To Time, Place, And Person] : oriented to person, place, and time [Person] : oriented to person [Place] : oriented to place [Time] : oriented to time [] : no respiratory distress [Respiration, Rhythm And Depth] : normal respiratory rhythm and effort [Exaggerated Use Of Accessory Muscles For Inspiration] : no accessory muscle use [Heart Rate And Rhythm] : heart rate was normal and rhythm regular

## 2024-01-10 NOTE — ASSESSMENT
[FreeTextEntry1] : IMPRESSION: 1. Pt is recovering well s/p 12/20/23 right frontal interhemispheric transcallosal approach and gross total resection of the third ventricle posterior colloid cyst.  Surgical incision is healing well without any evidence of infection.   PLAN; 1. f/u with Dr. Rashid and NP in 1 month. 2. Ok to wash hair and pat dry the incision thoroughly but gently. 3. Advised that sutures will dissolve over time. 4. Instructed on s/s of infection to report to us immediately such as incisional redness, drainage and fevers.

## 2024-02-09 ENCOUNTER — APPOINTMENT (OUTPATIENT)
Dept: NEUROSURGERY | Facility: CLINIC | Age: 39
End: 2024-02-09

## 2024-03-01 ENCOUNTER — APPOINTMENT (OUTPATIENT)
Dept: NEUROSURGERY | Facility: CLINIC | Age: 39
End: 2024-03-01

## 2024-03-12 NOTE — REASON FOR VISIT
[Home] : at home, [unfilled] , at the time of the visit. [Medical Office: ([facility])___] : at the medical office located in  [Verbal consent obtained from patient] : the patient, [unfilled] [de-identified] : 12/20/23

## 2024-05-08 ENCOUNTER — APPOINTMENT (OUTPATIENT)
Dept: NEUROLOGY | Facility: CLINIC | Age: 39
End: 2024-05-08

## 2024-05-30 PROBLEM — Z87.891 FORMER SMOKER: Status: ACTIVE | Noted: 2023-03-07

## 2024-05-30 NOTE — REASON FOR VISIT
[Home] : at home, [unfilled] , at the time of the visit. [Medical Office: ([facility])___] : at the medical office located in  [Verbal consent obtained from patient] : the patient, [unfilled] [Follow-Up: _____] : a [unfilled] follow-up visit oriented to person, place and time

## 2024-05-31 ENCOUNTER — APPOINTMENT (OUTPATIENT)
Dept: NEUROSURGERY | Facility: CLINIC | Age: 39
End: 2024-05-31

## 2024-05-31 DIAGNOSIS — Z87.891 PERSONAL HISTORY OF NICOTINE DEPENDENCE: ICD-10-CM

## 2024-06-03 ENCOUNTER — TRANSCRIPTION ENCOUNTER (OUTPATIENT)
Age: 39
End: 2024-06-03

## 2024-06-03 NOTE — HISTORY OF PRESENT ILLNESS
[FreeTextEntry1] : Reyes Martel presented in January 2024 with several months history of drop attack and has a past history of surgery for Rathke cleft cyst few months ago, at that time a colloid cyst was identified on the imaging, hyperdense on the CAT scan, hypointense on T1 MRI and isointense on T2 with no enhancement in favor of colloid cyst. There was only a little area of the passage of CSF so the cyst is partially occlusive. There is however, no hydrocephalus.  After discussion of several treatment options including observation, continuation of serial MRI, or surgical resection of the cyst. The patient would like to proceed with surgical resection as he is psychologically affected by the presence of the cyst that can potentially cause obstructive hydrocephalus also thinks that his episodes of drop attack might be related to the presence of the cyst and he does not wish to proceed with observation.  On 12/20/23 he underwent a right frontal interhemispheric transcallosal approach and gross total resection of the third ventricle posterior colloid cyst.   Today, pt is participating in a phone visit to discuss result of  Pt presents for his first post op visit today. States that he is feeling well and denies headaches, weakness in his extremities, denies drop attacks. Surgical incision is healing well without any evidence of infection.

## 2024-08-28 NOTE — PATIENT PROFILE ADULT - FUNCTIONAL ASSESSMENT - BASIC MOBILITY 3.
-- DO NOT REPLY / DO NOT REPLY ALL --  -- This inbox is not monitored. If this was sent to the wrong provider or department, reroute message to [facility] [employer] pool. --  -- Message is from [facility] ([facility]) --    General Patient Message: would like recommendation on someone to see possible foot infection big toe left foot    Caller Information       Contact Date/Time Type Contact Phone/Fax    08/28/2024 03:37 PM CDT Phone (Incoming) Brendan Ji (Self) [phone] (M)            Alternative phone number: none    Can a detailed message be left? Yes - Voicemail   Patient has been advised the message will be addressed within 2-3 business days.                 4 = No assist / stand by assistance

## 2024-09-04 ENCOUNTER — TRANSCRIPTION ENCOUNTER (OUTPATIENT)
Age: 39
End: 2024-09-04
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
## (undated) DEVICE — DRSG CURITY GAUZE SPONGE 4 X 4" 12-PLY

## (undated) DEVICE — Device

## (undated) DEVICE — SUT VICRYL 3-0 18" X-1 (POP-OFF)

## (undated) DEVICE — WOUND IRR SURGIPHOR

## (undated) DEVICE — VENODYNE/SCD SLEEVE CALF MEDIUM

## (undated) DEVICE — DRAPE TOWEL BLUE 17" X 24"

## (undated) DEVICE — MIDAS REX MR8 BALL FLUTED SM BORE 3MM X 10CM

## (undated) DEVICE — PROTECTOR HEEL / ELBOW FLUFFY

## (undated) DEVICE — DRAPE CAMERA VIDEO 7"X96"

## (undated) DEVICE — GOWN TRIMAX LG

## (undated) DEVICE — ELCTR PEDICLE SCREW PROBE 3MM BALL 1.8MM X 100MM

## (undated) DEVICE — VENODYNE/SCD SLEEVE CALF LARGE

## (undated) DEVICE — TRAP SPECIMEN SPUTUM 40CC

## (undated) DEVICE — POSITIONER FOAM EGG CRATE ULNAR 2PCS (PINK)

## (undated) DEVICE — SPECIMEN CONTAINER 100ML

## (undated) DEVICE — MARKING PEN W RULER

## (undated) DEVICE — SOL IRR POUR H2O 250ML

## (undated) DEVICE — TUBING SUCTION 20FT

## (undated) DEVICE — ELCTR BOVIE PENCIL SMOKE EVACUATION

## (undated) DEVICE — VISITEC 4X4

## (undated) DEVICE — WARMING BLANKET FULL ADULT

## (undated) DEVICE — ELCTR BOVIE TIP NEEDLE INSULATED 2.8" EDGE

## (undated) DEVICE — STORZ DURA MICRO KNIFE INSERT SICKLE-SHAPED

## (undated) DEVICE — SPHERE MARKER (5 SPHERES)

## (undated) DEVICE — DRSG XEROFORM 1 X 8"

## (undated) DEVICE — DRSG MEROCEL STANDARD NO STRING 8CM X 2CM

## (undated) DEVICE — DRSG TELFA 3 X 8

## (undated) DEVICE — STAPLER SKIN VISI-STAT 35 WIDE

## (undated) DEVICE — GLV 6.5 PROTEXIS (WHITE)

## (undated) DEVICE — CLEANING SHEATH ENDO-SCRUB FOR STORZ 7210FA ARTHROSCOPE 4MM 45 DEGREE

## (undated) DEVICE — BLADE MEDTRONIC ENT TRICUT ROTATABLE STRAIGHT 4MM X 11CM

## (undated) DEVICE — DRAPE 1/2 SHEET 40X57"

## (undated) DEVICE — FOLEY TRAY 16FR LF URINE METER SURESTEP

## (undated) DEVICE — MIDAS REX MR8 CLEARVIEW TN BALL 4.5MM X 13CM COARSE

## (undated) DEVICE — ELCTR SUBDERMAL NDL 27G X 1/2" WITH TWISTED PAIR

## (undated) DEVICE — ELCTR SUBDERMAL CORKSCREW NDL 1.2MM

## (undated) DEVICE — LAP PAD 18 X 18"

## (undated) DEVICE — STORZ DURA MICRO KNIFE INSERT POINTED

## (undated) DEVICE — SUT ETHILON 3-0 18" FS-1

## (undated) DEVICE — ELCTR BOVIE TIP BLADE INSULATED 2.75" EDGE

## (undated) DEVICE — SOL IRR POUR NS 0.9% 500ML

## (undated) DEVICE — SOL ANTI FOG

## (undated) DEVICE — GLV 7.5 PROTEXIS (WHITE)

## (undated) DEVICE — APPLICATOR EXTENDED TIP 8CM

## (undated) DEVICE — CANISTER SUCTION 2000CC

## (undated) DEVICE — CLEANING SHEATH ENDO-SCRUB FOR STORZ 7210AA TELESCOPE 4MM 0 DEGREE

## (undated) DEVICE — ELCTR 4-DISC 20MM 49" (RED, BLUE, GREEN, BLACK)

## (undated) DEVICE — GLV 8.5 PROTEXIS (WHITE)

## (undated) DEVICE — SUT VICRYL 2-0 18" CP-2 UNDYED (POP-OFF)

## (undated) DEVICE — PACK MINOR WITH LAP

## (undated) DEVICE — SNAP ON SPHERZ 5 PACK

## (undated) DEVICE — ELCTR ENT BOVIE SUCTION 10FR 6"

## (undated) DEVICE — MIDAS REX MR8 TAPERED SM BORE 2.3MM X 7CM FOOTED

## (undated) DEVICE — ELCTR SUBDERMAL NDL CLASSIC 1.5M X 59" (6 COLOR)

## (undated) DEVICE — DRSG STOCKINETTE IMPERVIOUS XL

## (undated) DEVICE — ELCTR MONOPOLAR STIMULATOR PROBE FLUSH-TIP

## (undated) DEVICE — MIDAS REX MR8 TAPERED SM BORE 1.MM X 7CM

## (undated) DEVICE — ELCTR BIPOLAR PROBE

## (undated) DEVICE — GLV 7 PROTEXIS (WHITE)

## (undated) DEVICE — DRAPE SPLIT SHEET 77" X 108"

## (undated) DEVICE — WARMING BLANKET LOWER ADULT

## (undated) DEVICE — BIPOLAR FORCEP SYMMETRY BAYONET 7" X 1.5MM SMOOTH (SILVER)

## (undated) DEVICE — SOL IRR POUR NS 0.9% 1000ML

## (undated) DEVICE — SYR LUER LOK 5CC

## (undated) DEVICE — SYR LUER LOK 10CC

## (undated) DEVICE — GLV 8 PROTEXIS (WHITE)

## (undated) DEVICE — DRAPE INSTRUMENT POUCH 6.75" X 11"

## (undated) DEVICE — CODMAN PERFORATOR 14MM (BLUE)

## (undated) DEVICE — DRSG TAPE HYPAFIX 4"

## (undated) DEVICE — AESCULAP SCALPFIX 10 CLIPS

## (undated) DEVICE — BUR MEDTRONIC ENT TRANSNASAL SKULL BASE DIAMOND ROUND 15 DEGREE 4.5MM X 13CM

## (undated) DEVICE — SYR LUER LOK 20CC

## (undated) DEVICE — DRSG NASOPORE 4CM FIRM

## (undated) DEVICE — ELCTR BIPOLAR CORD AESCULAP 12FT DISP

## (undated) DEVICE — DRAPE 3/4 SHEET W REINFORCEMENT 56X77"

## (undated) DEVICE — DRSG TELFA 3 X 4

## (undated) DEVICE — MEDICATION LABELS W MARKER

## (undated) DEVICE — MIDAS REX MR7 LUBRICANT DIFFUSER CARTRIDGE

## (undated) DEVICE — PREP CHLORAPREP HI-LITE ORANGE 10.5ML

## (undated) DEVICE — SUT NUROLON 4-0 8-18" TF (POP-OFF)

## (undated) DEVICE — PACK NEURO

## (undated) DEVICE — LONE STAR ELASTIC STAY HOOK 12MM BLUNT

## (undated) DEVICE — LABELS BLANK W PEN

## (undated) DEVICE — DRAPE MICROSCOPE ZEISS

## (undated) DEVICE — FOLEY TRAY 16FR 5CC LTX UMETER CLOSED